# Patient Record
Sex: FEMALE | Race: WHITE | NOT HISPANIC OR LATINO | Employment: FULL TIME | URBAN - METROPOLITAN AREA
[De-identification: names, ages, dates, MRNs, and addresses within clinical notes are randomized per-mention and may not be internally consistent; named-entity substitution may affect disease eponyms.]

---

## 2019-04-11 ENCOUNTER — HOSPITAL ENCOUNTER (INPATIENT)
Facility: HOSPITAL | Age: 44
LOS: 1 days | Discharge: HOME | DRG: 812 | End: 2019-04-12
Attending: EMERGENCY MEDICINE | Admitting: INTERNAL MEDICINE
Payer: COMMERCIAL

## 2019-04-11 DIAGNOSIS — D64.9 SYMPTOMATIC ANEMIA: ICD-10-CM

## 2019-04-11 DIAGNOSIS — K25.9 GASTRIC ULCER WITHOUT HEMORRHAGE OR PERFORATION, UNSPECIFIED CHRONICITY: Primary | ICD-10-CM

## 2019-04-11 PROBLEM — T80.92XA: Status: ACTIVE | Noted: 2019-04-11

## 2019-04-11 PROBLEM — Z98.84 HISTORY OF ROUX-EN-Y GASTRIC BYPASS: Status: ACTIVE | Noted: 2017-02-20

## 2019-04-11 PROBLEM — F41.9 ANXIETY AND DEPRESSION: Status: ACTIVE | Noted: 2017-02-20

## 2019-04-11 PROBLEM — E87.6 HYPOKALEMIA: Status: ACTIVE | Noted: 2019-04-11

## 2019-04-11 PROBLEM — F32.A ANXIETY AND DEPRESSION: Status: ACTIVE | Noted: 2017-02-20

## 2019-04-11 PROBLEM — G47.00 INSOMNIA: Status: ACTIVE | Noted: 2017-02-20

## 2019-04-11 LAB
ABO GROUP BLD: NORMAL
ALBUMIN SERPL BCP-MCNC: 3.2 G/DL (ref 3.5–5)
ALP SERPL-CCNC: 75 U/L (ref 46–116)
ALT SERPL W P-5'-P-CCNC: 13 U/L (ref 12–78)
ANION GAP SERPL CALCULATED.3IONS-SCNC: 5 MMOL/L (ref 4–13)
ANION GAP SERPL CALCULATED.3IONS-SCNC: 7 MMOL/L (ref 4–13)
APTT PPP: 24 SECONDS (ref 26–38)
AST SERPL W P-5'-P-CCNC: 17 U/L (ref 5–45)
ATRIAL RATE: 105 BPM
BASOPHILS # BLD AUTO: 0.04 THOUSANDS/ΜL (ref 0–0.1)
BASOPHILS NFR BLD AUTO: 1 % (ref 0–1)
BILIRUB SERPL-MCNC: 0.42 MG/DL (ref 0.2–1)
BILIRUB UR QL STRIP: ABNORMAL
BLD GP AB SCN SERPL QL: NEGATIVE
BUN SERPL-MCNC: 5 MG/DL (ref 5–25)
BUN SERPL-MCNC: 8 MG/DL (ref 5–25)
CALCIUM SERPL-MCNC: 7.8 MG/DL (ref 8.3–10.1)
CALCIUM SERPL-MCNC: 8 MG/DL (ref 8.3–10.1)
CHLORIDE SERPL-SCNC: 103 MMOL/L (ref 100–108)
CHLORIDE SERPL-SCNC: 106 MMOL/L (ref 100–108)
CLARITY UR: ABNORMAL
CO2 SERPL-SCNC: 26 MMOL/L (ref 21–32)
CO2 SERPL-SCNC: 28 MMOL/L (ref 21–32)
COLOR UR: YELLOW
CREAT SERPL-MCNC: 0.65 MG/DL (ref 0.6–1.3)
CREAT SERPL-MCNC: 0.76 MG/DL (ref 0.6–1.3)
EOSINOPHIL # BLD AUTO: 0.08 THOUSAND/ΜL (ref 0–0.61)
EOSINOPHIL NFR BLD AUTO: 1 % (ref 0–6)
ERYTHROCYTE [DISTWIDTH] IN BLOOD BY AUTOMATED COUNT: 24.7 % (ref 11.6–15.1)
EXT PREG TEST URINE: NEGATIVE
FERRITIN SERPL-MCNC: 3 NG/ML (ref 8–388)
FOLATE SERPL-MCNC: 3.3 NG/ML (ref 3.1–17.5)
GFR SERPL CREATININE-BSD FRML MDRD: 110 ML/MIN/1.73SQ M
GFR SERPL CREATININE-BSD FRML MDRD: 97 ML/MIN/1.73SQ M
GLUCOSE SERPL-MCNC: 104 MG/DL (ref 65–140)
GLUCOSE SERPL-MCNC: 146 MG/DL (ref 65–140)
GLUCOSE UR STRIP-MCNC: NEGATIVE MG/DL
HCT VFR BLD AUTO: 23.6 % (ref 34.8–46.1)
HCYS SERPL-SCNC: 48.8 UMOL/L (ref 3.7–11.2)
HGB BLD-MCNC: 6.4 G/DL (ref 11.5–15.4)
HGB UR QL STRIP.AUTO: NEGATIVE
IMM GRANULOCYTES # BLD AUTO: 0.03 THOUSAND/UL (ref 0–0.2)
IMM GRANULOCYTES NFR BLD AUTO: 0 % (ref 0–2)
INR PPP: 1.02 (ref 0.86–1.17)
IRON SATN MFR SERPL: 4 %
IRON SERPL-MCNC: 20 UG/DL (ref 50–170)
KETONES UR STRIP-MCNC: ABNORMAL MG/DL
LEUKOCYTE ESTERASE UR QL STRIP: NEGATIVE
LYMPHOCYTES # BLD AUTO: 1.21 THOUSANDS/ΜL (ref 0.6–4.47)
LYMPHOCYTES NFR BLD AUTO: 16 % (ref 14–44)
MCH RBC QN AUTO: 21.1 PG (ref 26.8–34.3)
MCHC RBC AUTO-ENTMCNC: 27.1 G/DL (ref 31.4–37.4)
MCV RBC AUTO: 78 FL (ref 82–98)
MONOCYTES # BLD AUTO: 0.43 THOUSAND/ΜL (ref 0.17–1.22)
MONOCYTES NFR BLD AUTO: 6 % (ref 4–12)
NEUTROPHILS # BLD AUTO: 5.62 THOUSANDS/ΜL (ref 1.85–7.62)
NEUTS SEG NFR BLD AUTO: 76 % (ref 43–75)
NITRITE UR QL STRIP: NEGATIVE
NRBC BLD AUTO-RTO: 0 /100 WBCS
P AXIS: 76 DEGREES
PH UR STRIP.AUTO: 5.5 [PH] (ref 4.5–8)
PLATELET # BLD AUTO: 331 THOUSANDS/UL (ref 149–390)
PMV BLD AUTO: 9.7 FL (ref 8.9–12.7)
POTASSIUM SERPL-SCNC: 2.9 MMOL/L (ref 3.5–5.3)
POTASSIUM SERPL-SCNC: 3.6 MMOL/L (ref 3.5–5.3)
PR INTERVAL: 122 MS
PROT SERPL-MCNC: 7.4 G/DL (ref 6.4–8.2)
PROT UR STRIP-MCNC: NEGATIVE MG/DL
PROTHROMBIN TIME: 13.5 SECONDS (ref 11.8–14.2)
QRS AXIS: 72 DEGREES
QRSD INTERVAL: 82 MS
QT INTERVAL: 358 MS
QTC INTERVAL: 473 MS
RBC # BLD AUTO: 3.03 MILLION/UL (ref 3.81–5.12)
RH BLD: POSITIVE
SODIUM SERPL-SCNC: 136 MMOL/L (ref 136–145)
SODIUM SERPL-SCNC: 139 MMOL/L (ref 136–145)
SP GR UR STRIP.AUTO: >=1.03 (ref 1–1.03)
SPECIMEN EXPIRATION DATE: NORMAL
T WAVE AXIS: 55 DEGREES
TIBC SERPL-MCNC: 511 UG/DL (ref 250–450)
UROBILINOGEN UR QL STRIP.AUTO: 1 E.U./DL
VENTRICULAR RATE: 105 BPM
VIT B12 SERPL-MCNC: 86 PG/ML (ref 100–900)
WBC # BLD AUTO: 7.41 THOUSAND/UL (ref 4.31–10.16)

## 2019-04-11 PROCEDURE — 99221 1ST HOSP IP/OBS SF/LOW 40: CPT | Performed by: INTERNAL MEDICINE

## 2019-04-11 PROCEDURE — 86901 BLOOD TYPING SEROLOGIC RH(D): CPT | Performed by: EMERGENCY MEDICINE

## 2019-04-11 PROCEDURE — 99285 EMERGENCY DEPT VISIT HI MDM: CPT

## 2019-04-11 PROCEDURE — 82607 VITAMIN B-12: CPT | Performed by: INTERNAL MEDICINE

## 2019-04-11 PROCEDURE — 86900 BLOOD TYPING SEROLOGIC ABO: CPT | Performed by: EMERGENCY MEDICINE

## 2019-04-11 PROCEDURE — 85730 THROMBOPLASTIN TIME PARTIAL: CPT | Performed by: EMERGENCY MEDICINE

## 2019-04-11 PROCEDURE — 30233P1 TRANSFUSION OF NONAUTOLOGOUS FROZEN RED CELLS INTO PERIPHERAL VEIN, PERCUTANEOUS APPROACH: ICD-10-PCS | Performed by: INTERNAL MEDICINE

## 2019-04-11 PROCEDURE — P9016 RBC LEUKOCYTES REDUCED: HCPCS

## 2019-04-11 PROCEDURE — 83540 ASSAY OF IRON: CPT | Performed by: INTERNAL MEDICINE

## 2019-04-11 PROCEDURE — 93005 ELECTROCARDIOGRAM TRACING: CPT

## 2019-04-11 PROCEDURE — 81003 URINALYSIS AUTO W/O SCOPE: CPT

## 2019-04-11 PROCEDURE — 82746 ASSAY OF FOLIC ACID SERUM: CPT | Performed by: INTERNAL MEDICINE

## 2019-04-11 PROCEDURE — 99223 1ST HOSP IP/OBS HIGH 75: CPT | Performed by: INTERNAL MEDICINE

## 2019-04-11 PROCEDURE — 82272 OCCULT BLD FECES 1-3 TESTS: CPT

## 2019-04-11 PROCEDURE — 83090 ASSAY OF HOMOCYSTEINE: CPT | Performed by: INTERNAL MEDICINE

## 2019-04-11 PROCEDURE — 81025 URINE PREGNANCY TEST: CPT | Performed by: EMERGENCY MEDICINE

## 2019-04-11 PROCEDURE — 99285 EMERGENCY DEPT VISIT HI MDM: CPT | Performed by: EMERGENCY MEDICINE

## 2019-04-11 PROCEDURE — 36415 COLL VENOUS BLD VENIPUNCTURE: CPT | Performed by: EMERGENCY MEDICINE

## 2019-04-11 PROCEDURE — 82728 ASSAY OF FERRITIN: CPT | Performed by: INTERNAL MEDICINE

## 2019-04-11 PROCEDURE — 85025 COMPLETE CBC W/AUTO DIFF WBC: CPT | Performed by: EMERGENCY MEDICINE

## 2019-04-11 PROCEDURE — 86850 RBC ANTIBODY SCREEN: CPT | Performed by: EMERGENCY MEDICINE

## 2019-04-11 PROCEDURE — 80048 BASIC METABOLIC PNL TOTAL CA: CPT | Performed by: INTERNAL MEDICINE

## 2019-04-11 PROCEDURE — 93010 ELECTROCARDIOGRAM REPORT: CPT | Performed by: INTERNAL MEDICINE

## 2019-04-11 PROCEDURE — 86920 COMPATIBILITY TEST SPIN: CPT

## 2019-04-11 PROCEDURE — 80053 COMPREHEN METABOLIC PANEL: CPT | Performed by: EMERGENCY MEDICINE

## 2019-04-11 PROCEDURE — 85610 PROTHROMBIN TIME: CPT | Performed by: EMERGENCY MEDICINE

## 2019-04-11 PROCEDURE — 83550 IRON BINDING TEST: CPT | Performed by: INTERNAL MEDICINE

## 2019-04-11 RX ORDER — ALPRAZOLAM 0.5 MG/1
1 TABLET ORAL DAILY PRN
Status: DISCONTINUED | OUTPATIENT
Start: 2019-04-11 | End: 2019-04-12 | Stop reason: HOSPADM

## 2019-04-11 RX ORDER — TRAMADOL HYDROCHLORIDE 50 MG/1
50 TABLET ORAL ONCE
Status: COMPLETED | OUTPATIENT
Start: 2019-04-11 | End: 2019-04-11

## 2019-04-11 RX ORDER — POTASSIUM CHLORIDE 20 MEQ/1
40 TABLET, EXTENDED RELEASE ORAL ONCE
Status: COMPLETED | OUTPATIENT
Start: 2019-04-11 | End: 2019-04-11

## 2019-04-11 RX ORDER — MULTIVIT WITH IRON,MINERALS
15 LIQUID (ML) ORAL DAILY
Status: DISCONTINUED | OUTPATIENT
Start: 2019-04-11 | End: 2019-04-12 | Stop reason: HOSPADM

## 2019-04-11 RX ORDER — MAGNESIUM SULFATE HEPTAHYDRATE 40 MG/ML
2 INJECTION, SOLUTION INTRAVENOUS ONCE
Status: COMPLETED | OUTPATIENT
Start: 2019-04-11 | End: 2019-04-11

## 2019-04-11 RX ORDER — POTASSIUM CHLORIDE 20 MEQ/1
60 TABLET, EXTENDED RELEASE ORAL ONCE
Status: COMPLETED | OUTPATIENT
Start: 2019-04-11 | End: 2019-04-11

## 2019-04-11 RX ORDER — POTASSIUM CHLORIDE 20 MEQ/1
20 TABLET, EXTENDED RELEASE ORAL ONCE
Status: COMPLETED | OUTPATIENT
Start: 2019-04-11 | End: 2019-04-11

## 2019-04-11 RX ORDER — PANTOPRAZOLE SODIUM 40 MG/1
40 TABLET, DELAYED RELEASE ORAL
Status: DISCONTINUED | OUTPATIENT
Start: 2019-04-11 | End: 2019-04-12 | Stop reason: HOSPADM

## 2019-04-11 RX ORDER — METHOCARBAMOL 750 MG/1
750 TABLET, FILM COATED ORAL EVERY 6 HOURS PRN
Status: DISCONTINUED | OUTPATIENT
Start: 2019-04-11 | End: 2019-04-12 | Stop reason: HOSPADM

## 2019-04-11 RX ORDER — DOCUSATE SODIUM 100 MG/1
100 CAPSULE, LIQUID FILLED ORAL 2 TIMES DAILY
Status: DISCONTINUED | OUTPATIENT
Start: 2019-04-11 | End: 2019-04-12 | Stop reason: HOSPADM

## 2019-04-11 RX ORDER — CITALOPRAM 20 MG/1
20 TABLET ORAL DAILY
Status: DISCONTINUED | OUTPATIENT
Start: 2019-04-11 | End: 2019-04-12 | Stop reason: HOSPADM

## 2019-04-11 RX ORDER — LIDOCAINE 50 MG/G
1 PATCH TOPICAL DAILY
Status: DISCONTINUED | OUTPATIENT
Start: 2019-04-12 | End: 2019-04-12 | Stop reason: HOSPADM

## 2019-04-11 RX ORDER — ACETAMINOPHEN 325 MG/1
650 TABLET ORAL EVERY 6 HOURS PRN
Status: DISCONTINUED | OUTPATIENT
Start: 2019-04-11 | End: 2019-04-12 | Stop reason: HOSPADM

## 2019-04-11 RX ORDER — ONDANSETRON 2 MG/ML
4 INJECTION INTRAMUSCULAR; INTRAVENOUS EVERY 6 HOURS PRN
Status: DISCONTINUED | OUTPATIENT
Start: 2019-04-11 | End: 2019-04-12 | Stop reason: HOSPADM

## 2019-04-11 RX ORDER — ERGOCALCIFEROL 1.25 MG/1
50000 CAPSULE ORAL 3 TIMES WEEKLY
Status: DISCONTINUED | OUTPATIENT
Start: 2019-04-12 | End: 2019-04-12 | Stop reason: HOSPADM

## 2019-04-11 RX ADMIN — MAGNESIUM SULFATE HEPTAHYDRATE 2 G: 40 INJECTION, SOLUTION INTRAVENOUS at 14:59

## 2019-04-11 RX ADMIN — CITALOPRAM HYDROBROMIDE 20 MG: 20 TABLET ORAL at 14:53

## 2019-04-11 RX ADMIN — POTASSIUM CHLORIDE 40 MEQ: 1500 TABLET, EXTENDED RELEASE ORAL at 19:50

## 2019-04-11 RX ADMIN — POTASSIUM CHLORIDE 20 MEQ: 1500 TABLET, EXTENDED RELEASE ORAL at 14:54

## 2019-04-11 RX ADMIN — PANTOPRAZOLE SODIUM 40 MG: 40 TABLET, DELAYED RELEASE ORAL at 17:18

## 2019-04-11 RX ADMIN — TRAMADOL HYDROCHLORIDE 50 MG: 50 TABLET ORAL at 20:30

## 2019-04-11 RX ADMIN — POTASSIUM CHLORIDE 60 MEQ: 1500 TABLET, EXTENDED RELEASE ORAL at 11:54

## 2019-04-12 VITALS
DIASTOLIC BLOOD PRESSURE: 64 MMHG | RESPIRATION RATE: 20 BRPM | SYSTOLIC BLOOD PRESSURE: 126 MMHG | BODY MASS INDEX: 36.73 KG/M2 | TEMPERATURE: 97.9 F | HEART RATE: 84 BPM | HEIGHT: 65 IN | OXYGEN SATURATION: 97 % | WEIGHT: 220.46 LBS

## 2019-04-12 LAB
ABO GROUP BLD BPU: NORMAL
ABO GROUP BLD BPU: NORMAL
ALBUMIN SERPL BCP-MCNC: 2.6 G/DL (ref 3.5–5)
ALP SERPL-CCNC: 61 U/L (ref 46–116)
ALT SERPL W P-5'-P-CCNC: 10 U/L (ref 12–78)
ANION GAP SERPL CALCULATED.3IONS-SCNC: 4 MMOL/L (ref 4–13)
AST SERPL W P-5'-P-CCNC: 15 U/L (ref 5–45)
BILIRUB SERPL-MCNC: 0.51 MG/DL (ref 0.2–1)
BPU ID: NORMAL
BPU ID: NORMAL
BUN SERPL-MCNC: 5 MG/DL (ref 5–25)
CALCIUM SERPL-MCNC: 7.6 MG/DL (ref 8.3–10.1)
CHLORIDE SERPL-SCNC: 109 MMOL/L (ref 100–108)
CO2 SERPL-SCNC: 26 MMOL/L (ref 21–32)
CREAT SERPL-MCNC: 0.55 MG/DL (ref 0.6–1.3)
CROSSMATCH: NORMAL
CROSSMATCH: NORMAL
ERYTHROCYTE [DISTWIDTH] IN BLOOD BY AUTOMATED COUNT: 22.8 % (ref 11.6–15.1)
GFR SERPL CREATININE-BSD FRML MDRD: 116 ML/MIN/1.73SQ M
GLUCOSE SERPL-MCNC: 99 MG/DL (ref 65–140)
HCT VFR BLD AUTO: 26.4 % (ref 34.8–46.1)
HGB BLD-MCNC: 7.5 G/DL (ref 11.5–15.4)
MAGNESIUM SERPL-MCNC: 2.2 MG/DL (ref 1.6–2.6)
MCH RBC QN AUTO: 23.1 PG (ref 26.8–34.3)
MCHC RBC AUTO-ENTMCNC: 28.4 G/DL (ref 31.4–37.4)
MCV RBC AUTO: 81 FL (ref 82–98)
PHOSPHATE SERPL-MCNC: 2.7 MG/DL (ref 2.7–4.5)
PLATELET # BLD AUTO: 239 THOUSANDS/UL (ref 149–390)
PMV BLD AUTO: 9.6 FL (ref 8.9–12.7)
POTASSIUM SERPL-SCNC: 3.9 MMOL/L (ref 3.5–5.3)
PROT SERPL-MCNC: 6.4 G/DL (ref 6.4–8.2)
RBC # BLD AUTO: 3.25 MILLION/UL (ref 3.81–5.12)
SODIUM SERPL-SCNC: 139 MMOL/L (ref 136–145)
UNIT DISPENSE STATUS: NORMAL
UNIT DISPENSE STATUS: NORMAL
UNIT PRODUCT CODE: NORMAL
UNIT PRODUCT CODE: NORMAL
UNIT RH: NORMAL
UNIT RH: NORMAL
WBC # BLD AUTO: 5.14 THOUSAND/UL (ref 4.31–10.16)

## 2019-04-12 PROCEDURE — 80053 COMPREHEN METABOLIC PANEL: CPT | Performed by: INTERNAL MEDICINE

## 2019-04-12 PROCEDURE — 85027 COMPLETE CBC AUTOMATED: CPT | Performed by: INTERNAL MEDICINE

## 2019-04-12 PROCEDURE — 83735 ASSAY OF MAGNESIUM: CPT | Performed by: INTERNAL MEDICINE

## 2019-04-12 PROCEDURE — 84100 ASSAY OF PHOSPHORUS: CPT | Performed by: INTERNAL MEDICINE

## 2019-04-12 PROCEDURE — 99238 HOSP IP/OBS DSCHRG MGMT 30/<: CPT | Performed by: INTERNAL MEDICINE

## 2019-04-12 RX ORDER — METHOCARBAMOL 750 MG/1
750 TABLET, FILM COATED ORAL EVERY 6 HOURS PRN
Qty: 30 TABLET | Refills: 0 | Status: SHIPPED | OUTPATIENT
Start: 2019-04-12 | End: 2021-10-14 | Stop reason: ALTCHOICE

## 2019-04-12 RX ORDER — DOCUSATE SODIUM 100 MG/1
100 CAPSULE, LIQUID FILLED ORAL 2 TIMES DAILY
Qty: 10 CAPSULE | Refills: 0 | Status: SHIPPED | OUTPATIENT
Start: 2019-04-12 | End: 2021-10-14 | Stop reason: ALTCHOICE

## 2019-04-12 RX ORDER — LIDOCAINE 50 MG/G
1 PATCH TOPICAL DAILY
Qty: 30 PATCH | Refills: 0 | Status: SHIPPED | OUTPATIENT
Start: 2019-04-13

## 2019-04-12 RX ORDER — ERGOCALCIFEROL 1.25 MG/1
50000 CAPSULE ORAL 3 TIMES WEEKLY
Qty: 90 CAPSULE | Refills: 0 | Status: SHIPPED | OUTPATIENT
Start: 2019-04-15

## 2019-04-12 RX ORDER — MULTIVIT WITH IRON,MINERALS
15 LIQUID (ML) ORAL DAILY
Qty: 236 ML | Refills: 0 | Status: SHIPPED | OUTPATIENT
Start: 2019-04-13

## 2019-04-12 RX ORDER — PANTOPRAZOLE SODIUM 40 MG/1
40 TABLET, DELAYED RELEASE ORAL
Qty: 60 TABLET | Refills: 0 | Status: ON HOLD | OUTPATIENT
Start: 2019-04-12 | End: 2021-10-15 | Stop reason: SDUPTHER

## 2019-04-12 RX ORDER — ACETAMINOPHEN 325 MG/1
650 TABLET ORAL EVERY 6 HOURS PRN
Qty: 30 TABLET | Refills: 0 | Status: SHIPPED | OUTPATIENT
Start: 2019-04-12

## 2019-04-12 RX ADMIN — CITALOPRAM HYDROBROMIDE 20 MG: 20 TABLET ORAL at 08:51

## 2019-04-12 RX ADMIN — PANTOPRAZOLE SODIUM 40 MG: 40 TABLET, DELAYED RELEASE ORAL at 06:06

## 2019-04-12 RX ADMIN — ERGOCALCIFEROL 50000 UNITS: 1.25 CAPSULE ORAL at 08:51

## 2019-04-12 RX ADMIN — LIDOCAINE 1 PATCH: 50 PATCH CUTANEOUS at 08:51

## 2019-04-12 RX ADMIN — MULTIVITAMIN 15 ML: LIQUID ORAL at 08:52

## 2019-04-17 ENCOUNTER — TELEPHONE (OUTPATIENT)
Dept: GASTROENTEROLOGY | Facility: CLINIC | Age: 44
End: 2019-04-17

## 2021-10-14 ENCOUNTER — APPOINTMENT (OUTPATIENT)
Dept: ULTRASOUND IMAGING | Facility: HOSPITAL | Age: 46
End: 2021-10-14
Payer: COMMERCIAL

## 2021-10-14 ENCOUNTER — APPOINTMENT (OUTPATIENT)
Dept: GASTROENTEROLOGY | Facility: HOSPITAL | Age: 46
End: 2021-10-14
Payer: COMMERCIAL

## 2021-10-14 ENCOUNTER — HOSPITAL ENCOUNTER (OUTPATIENT)
Facility: HOSPITAL | Age: 46
Setting detail: OBSERVATION
Discharge: HOME | End: 2021-10-15
Attending: EMERGENCY MEDICINE | Admitting: INTERNAL MEDICINE
Payer: COMMERCIAL

## 2021-10-14 ENCOUNTER — ANESTHESIA (OUTPATIENT)
Dept: GASTROENTEROLOGY | Facility: HOSPITAL | Age: 46
End: 2021-10-14
Payer: COMMERCIAL

## 2021-10-14 ENCOUNTER — ANESTHESIA EVENT (OUTPATIENT)
Dept: GASTROENTEROLOGY | Facility: HOSPITAL | Age: 46
End: 2021-10-14
Payer: COMMERCIAL

## 2021-10-14 DIAGNOSIS — K25.9 GASTRIC ULCER WITHOUT HEMORRHAGE OR PERFORATION, UNSPECIFIED CHRONICITY: ICD-10-CM

## 2021-10-14 DIAGNOSIS — K92.1 MELENA: ICD-10-CM

## 2021-10-14 DIAGNOSIS — Z98.84 HISTORY OF ROUX-EN-Y GASTRIC BYPASS: ICD-10-CM

## 2021-10-14 DIAGNOSIS — K92.2 GI BLEED: Primary | ICD-10-CM

## 2021-10-14 DIAGNOSIS — D64.9 ANEMIA: ICD-10-CM

## 2021-10-14 LAB
ABO GROUP BLD: NORMAL
ALBUMIN SERPL BCP-MCNC: 2.9 G/DL (ref 3.5–5)
ALP SERPL-CCNC: 159 U/L (ref 46–116)
ALT SERPL W P-5'-P-CCNC: 21 U/L (ref 12–78)
ANION GAP SERPL CALCULATED.3IONS-SCNC: 10 MMOL/L (ref 4–13)
APTT PPP: 25 SECONDS (ref 23–37)
AST SERPL W P-5'-P-CCNC: 46 U/L (ref 5–45)
ATRIAL RATE: 111 BPM
BASOPHILS # BLD AUTO: 0.02 THOUSANDS/ΜL (ref 0–0.1)
BASOPHILS NFR BLD AUTO: 0 % (ref 0–1)
BILIRUB SERPL-MCNC: 0.39 MG/DL (ref 0.2–1)
BILIRUB UR QL STRIP: NEGATIVE
BLD GP AB SCN SERPL QL: NEGATIVE
BUN SERPL-MCNC: 13 MG/DL (ref 5–25)
CALCIUM ALBUM COR SERPL-MCNC: 9.2 MG/DL (ref 8.3–10.1)
CALCIUM SERPL-MCNC: 8.3 MG/DL (ref 8.3–10.1)
CHLORIDE SERPL-SCNC: 104 MMOL/L (ref 100–108)
CLARITY UR: NORMAL
CO2 SERPL-SCNC: 26 MMOL/L (ref 21–32)
COLOR UR: NORMAL
CREAT SERPL-MCNC: 0.65 MG/DL (ref 0.6–1.3)
EOSINOPHIL # BLD AUTO: 0.04 THOUSAND/ΜL (ref 0–0.61)
EOSINOPHIL NFR BLD AUTO: 1 % (ref 0–6)
ERYTHROCYTE [DISTWIDTH] IN BLOOD BY AUTOMATED COUNT: 14.8 % (ref 11.6–15.1)
EXT PREGNANCY TEST URINE: NEGATIVE
EXT. CONTROL: NORMAL
GFR SERPL CREATININE-BSD FRML MDRD: 108 ML/MIN/1.73SQ M
GLUCOSE SERPL-MCNC: 111 MG/DL (ref 65–140)
GLUCOSE UR STRIP-MCNC: NEGATIVE MG/DL
HCT VFR BLD AUTO: 29.2 % (ref 34.8–46.1)
HCT VFR BLD AUTO: 30.7 % (ref 34.8–46.1)
HGB BLD-MCNC: 10.3 G/DL (ref 11.5–15.4)
HGB BLD-MCNC: 9.4 G/DL (ref 11.5–15.4)
HGB UR QL STRIP.AUTO: NEGATIVE
IMM GRANULOCYTES # BLD AUTO: 0.06 THOUSAND/UL (ref 0–0.2)
IMM GRANULOCYTES NFR BLD AUTO: 1 % (ref 0–2)
INR PPP: 0.96 (ref 0.84–1.19)
KETONES UR STRIP-MCNC: NEGATIVE MG/DL
LEUKOCYTE ESTERASE UR QL STRIP: NEGATIVE
LYMPHOCYTES # BLD AUTO: 1.26 THOUSANDS/ΜL (ref 0.6–4.47)
LYMPHOCYTES NFR BLD AUTO: 20 % (ref 14–44)
MCH RBC QN AUTO: 39.5 PG (ref 26.8–34.3)
MCHC RBC AUTO-ENTMCNC: 33.6 G/DL (ref 31.4–37.4)
MCV RBC AUTO: 118 FL (ref 82–98)
MONOCYTES # BLD AUTO: 0.34 THOUSAND/ΜL (ref 0.17–1.22)
MONOCYTES NFR BLD AUTO: 5 % (ref 4–12)
NEUTROPHILS # BLD AUTO: 4.56 THOUSANDS/ΜL (ref 1.85–7.62)
NEUTS SEG NFR BLD AUTO: 73 % (ref 43–75)
NITRITE UR QL STRIP: NEGATIVE
NRBC BLD AUTO-RTO: 0 /100 WBCS
P AXIS: 64 DEGREES
PH UR STRIP.AUTO: 7.5 [PH] (ref 4.5–8)
PLATELET # BLD AUTO: 268 THOUSANDS/UL (ref 149–390)
PMV BLD AUTO: 9.6 FL (ref 8.9–12.7)
POTASSIUM SERPL-SCNC: 3.7 MMOL/L (ref 3.5–5.3)
PR INTERVAL: 130 MS
PROT SERPL-MCNC: 6.5 G/DL (ref 6.4–8.2)
PROT UR STRIP-MCNC: NEGATIVE MG/DL
PROTHROMBIN TIME: 12.8 SECONDS (ref 11.6–14.5)
QRS AXIS: 56 DEGREES
QRSD INTERVAL: 76 MS
QT INTERVAL: 338 MS
QTC INTERVAL: 459 MS
RBC # BLD AUTO: 2.61 MILLION/UL (ref 3.81–5.12)
RH BLD: POSITIVE
SODIUM SERPL-SCNC: 140 MMOL/L (ref 136–145)
SP GR UR STRIP.AUTO: 1.01 (ref 1–1.03)
SPECIMEN EXPIRATION DATE: NORMAL
T WAVE AXIS: 61 DEGREES
TROPONIN I SERPL-MCNC: <0.02 NG/ML
UROBILINOGEN UR QL STRIP.AUTO: 0.2 E.U./DL
VENTRICULAR RATE: 111 BPM
WBC # BLD AUTO: 6.28 THOUSAND/UL (ref 4.31–10.16)

## 2021-10-14 PROCEDURE — 99220 PR INITIAL OBSERVATION CARE/DAY 70 MINUTES: CPT | Performed by: INTERNAL MEDICINE

## 2021-10-14 PROCEDURE — 93010 ELECTROCARDIOGRAM REPORT: CPT | Performed by: INTERNAL MEDICINE

## 2021-10-14 PROCEDURE — 84484 ASSAY OF TROPONIN QUANT: CPT | Performed by: PHYSICIAN ASSISTANT

## 2021-10-14 PROCEDURE — 81003 URINALYSIS AUTO W/O SCOPE: CPT

## 2021-10-14 PROCEDURE — 81025 URINE PREGNANCY TEST: CPT | Performed by: INTERNAL MEDICINE

## 2021-10-14 PROCEDURE — 85610 PROTHROMBIN TIME: CPT | Performed by: PHYSICIAN ASSISTANT

## 2021-10-14 PROCEDURE — 86901 BLOOD TYPING SEROLOGIC RH(D): CPT | Performed by: PHYSICIAN ASSISTANT

## 2021-10-14 PROCEDURE — 93005 ELECTROCARDIOGRAM TRACING: CPT

## 2021-10-14 PROCEDURE — 85025 COMPLETE CBC W/AUTO DIFF WBC: CPT | Performed by: PHYSICIAN ASSISTANT

## 2021-10-14 PROCEDURE — 85018 HEMOGLOBIN: CPT | Performed by: INTERNAL MEDICINE

## 2021-10-14 PROCEDURE — 85730 THROMBOPLASTIN TIME PARTIAL: CPT | Performed by: PHYSICIAN ASSISTANT

## 2021-10-14 PROCEDURE — C9113 INJ PANTOPRAZOLE SODIUM, VIA: HCPCS | Performed by: INTERNAL MEDICINE

## 2021-10-14 PROCEDURE — 99285 EMERGENCY DEPT VISIT HI MDM: CPT | Performed by: PHYSICIAN ASSISTANT

## 2021-10-14 PROCEDURE — 88305 TISSUE EXAM BY PATHOLOGIST: CPT | Performed by: PATHOLOGY

## 2021-10-14 PROCEDURE — 36415 COLL VENOUS BLD VENIPUNCTURE: CPT | Performed by: PHYSICIAN ASSISTANT

## 2021-10-14 PROCEDURE — 80053 COMPREHEN METABOLIC PANEL: CPT | Performed by: PHYSICIAN ASSISTANT

## 2021-10-14 PROCEDURE — 43239 EGD BIOPSY SINGLE/MULTIPLE: CPT | Performed by: INTERNAL MEDICINE

## 2021-10-14 PROCEDURE — 86900 BLOOD TYPING SEROLOGIC ABO: CPT | Performed by: PHYSICIAN ASSISTANT

## 2021-10-14 PROCEDURE — C9113 INJ PANTOPRAZOLE SODIUM, VIA: HCPCS | Performed by: PHYSICIAN ASSISTANT

## 2021-10-14 PROCEDURE — 76705 ECHO EXAM OF ABDOMEN: CPT

## 2021-10-14 PROCEDURE — 99204 OFFICE O/P NEW MOD 45 MIN: CPT | Performed by: INTERNAL MEDICINE

## 2021-10-14 PROCEDURE — 99285 EMERGENCY DEPT VISIT HI MDM: CPT

## 2021-10-14 PROCEDURE — 85014 HEMATOCRIT: CPT | Performed by: INTERNAL MEDICINE

## 2021-10-14 PROCEDURE — 86850 RBC ANTIBODY SCREEN: CPT | Performed by: PHYSICIAN ASSISTANT

## 2021-10-14 RX ORDER — SUCRALFATE ORAL 1 G/10ML
1000 SUSPENSION ORAL EVERY 6 HOURS SCHEDULED
Status: DISCONTINUED | OUTPATIENT
Start: 2021-10-14 | End: 2021-10-14

## 2021-10-14 RX ORDER — HYDROMORPHONE HCL/PF 1 MG/ML
0.5 SYRINGE (ML) INJECTION
Status: COMPLETED | OUTPATIENT
Start: 2021-10-14 | End: 2021-10-14

## 2021-10-14 RX ORDER — BUPROPION HYDROCHLORIDE 75 MG/1
75 TABLET ORAL DAILY
COMMUNITY

## 2021-10-14 RX ORDER — PROPOFOL 10 MG/ML
INJECTION, EMULSION INTRAVENOUS AS NEEDED
Status: DISCONTINUED | OUTPATIENT
Start: 2021-10-14 | End: 2021-10-14

## 2021-10-14 RX ORDER — NICOTINE 21 MG/24HR
1 PATCH, TRANSDERMAL 24 HOURS TRANSDERMAL DAILY
Status: DISCONTINUED | OUTPATIENT
Start: 2021-10-14 | End: 2021-10-15 | Stop reason: HOSPADM

## 2021-10-14 RX ORDER — SODIUM CHLORIDE 9 MG/ML
75 INJECTION, SOLUTION INTRAVENOUS CONTINUOUS
Status: DISCONTINUED | OUTPATIENT
Start: 2021-10-14 | End: 2021-10-15 | Stop reason: HOSPADM

## 2021-10-14 RX ORDER — SUCRALFATE 1 G/1
1 TABLET ORAL EVERY 6 HOURS
Status: DISCONTINUED | OUTPATIENT
Start: 2021-10-14 | End: 2021-10-15 | Stop reason: HOSPADM

## 2021-10-14 RX ORDER — ACETAMINOPHEN 325 MG/1
650 TABLET ORAL EVERY 6 HOURS PRN
Status: DISCONTINUED | OUTPATIENT
Start: 2021-10-14 | End: 2021-10-15 | Stop reason: HOSPADM

## 2021-10-14 RX ORDER — LIDOCAINE HYDROCHLORIDE 10 MG/ML
INJECTION, SOLUTION EPIDURAL; INFILTRATION; INTRACAUDAL; PERINEURAL AS NEEDED
Status: DISCONTINUED | OUTPATIENT
Start: 2021-10-14 | End: 2021-10-14

## 2021-10-14 RX ORDER — PANTOPRAZOLE SODIUM 40 MG/1
40 INJECTION, POWDER, FOR SOLUTION INTRAVENOUS ONCE
Status: COMPLETED | OUTPATIENT
Start: 2021-10-14 | End: 2021-10-14

## 2021-10-14 RX ORDER — CITALOPRAM 20 MG/1
20 TABLET ORAL DAILY
Status: DISCONTINUED | OUTPATIENT
Start: 2021-10-15 | End: 2021-10-15

## 2021-10-14 RX ORDER — ALPRAZOLAM 0.5 MG/1
1 TABLET ORAL 2 TIMES DAILY PRN
Status: DISCONTINUED | OUTPATIENT
Start: 2021-10-14 | End: 2021-10-15 | Stop reason: HOSPADM

## 2021-10-14 RX ADMIN — SUCRALFATE 1 G: 1 TABLET ORAL at 23:30

## 2021-10-14 RX ADMIN — ALPRAZOLAM 1 MG: 0.5 TABLET ORAL at 23:30

## 2021-10-14 RX ADMIN — SODIUM CHLORIDE 8 MG/HR: 9 INJECTION, SOLUTION INTRAVENOUS at 17:31

## 2021-10-14 RX ADMIN — PROPOFOL 180 MG: 10 INJECTION, EMULSION INTRAVENOUS at 12:15

## 2021-10-14 RX ADMIN — SODIUM CHLORIDE 1000 ML: 0.9 INJECTION, SOLUTION INTRAVENOUS at 09:42

## 2021-10-14 RX ADMIN — HYDROMORPHONE HYDROCHLORIDE 0.5 MG: 1 INJECTION, SOLUTION INTRAMUSCULAR; INTRAVENOUS; SUBCUTANEOUS at 20:42

## 2021-10-14 RX ADMIN — PROPOFOL 50 MG: 10 INJECTION, EMULSION INTRAVENOUS at 12:17

## 2021-10-14 RX ADMIN — HYDROMORPHONE HYDROCHLORIDE 0.5 MG: 1 INJECTION, SOLUTION INTRAMUSCULAR; INTRAVENOUS; SUBCUTANEOUS at 12:46

## 2021-10-14 RX ADMIN — LIDOCAINE HYDROCHLORIDE 50 MG: 10 INJECTION, SOLUTION EPIDURAL; INFILTRATION; INTRACAUDAL at 12:15

## 2021-10-14 RX ADMIN — SODIUM CHLORIDE: 0.9 INJECTION, SOLUTION INTRAVENOUS at 12:10

## 2021-10-14 RX ADMIN — SODIUM CHLORIDE 8 MG/HR: 9 INJECTION, SOLUTION INTRAVENOUS at 09:42

## 2021-10-14 RX ADMIN — PANTOPRAZOLE SODIUM 40 MG: 40 INJECTION, POWDER, FOR SOLUTION INTRAVENOUS at 09:42

## 2021-10-14 RX ADMIN — SUCRALFATE 1 G: 1 TABLET ORAL at 18:34

## 2021-10-15 VITALS
TEMPERATURE: 98.7 F | HEIGHT: 66 IN | WEIGHT: 196.4 LBS | OXYGEN SATURATION: 95 % | DIASTOLIC BLOOD PRESSURE: 62 MMHG | RESPIRATION RATE: 18 BRPM | BODY MASS INDEX: 31.57 KG/M2 | SYSTOLIC BLOOD PRESSURE: 113 MMHG | HEART RATE: 107 BPM

## 2021-10-15 LAB
ANION GAP SERPL CALCULATED.3IONS-SCNC: 7 MMOL/L (ref 4–13)
BASOPHILS # BLD AUTO: 0.02 THOUSANDS/ΜL (ref 0–0.1)
BASOPHILS NFR BLD AUTO: 1 % (ref 0–1)
BUN SERPL-MCNC: 5 MG/DL (ref 5–25)
CALCIUM SERPL-MCNC: 7.2 MG/DL (ref 8.3–10.1)
CHLORIDE SERPL-SCNC: 106 MMOL/L (ref 100–108)
CO2 SERPL-SCNC: 27 MMOL/L (ref 21–32)
CREAT SERPL-MCNC: 0.57 MG/DL (ref 0.6–1.3)
EOSINOPHIL # BLD AUTO: 0.05 THOUSAND/ΜL (ref 0–0.61)
EOSINOPHIL NFR BLD AUTO: 1 % (ref 0–6)
ERYTHROCYTE [DISTWIDTH] IN BLOOD BY AUTOMATED COUNT: 14.7 % (ref 11.6–15.1)
FOLATE SERPL-MCNC: 3.4 NG/ML (ref 3.1–17.5)
GFR SERPL CREATININE-BSD FRML MDRD: 113 ML/MIN/1.73SQ M
GLUCOSE P FAST SERPL-MCNC: 94 MG/DL (ref 65–99)
GLUCOSE SERPL-MCNC: 94 MG/DL (ref 65–140)
HCT VFR BLD AUTO: 23.8 % (ref 34.8–46.1)
HCT VFR BLD AUTO: 26.8 % (ref 34.8–46.1)
HGB BLD-MCNC: 7.6 G/DL (ref 11.5–15.4)
HGB BLD-MCNC: 8.7 G/DL (ref 11.5–15.4)
IMM GRANULOCYTES # BLD AUTO: 0.03 THOUSAND/UL (ref 0–0.2)
IMM GRANULOCYTES NFR BLD AUTO: 1 % (ref 0–2)
LYMPHOCYTES # BLD AUTO: 1.34 THOUSANDS/ΜL (ref 0.6–4.47)
LYMPHOCYTES NFR BLD AUTO: 36 % (ref 14–44)
MCH RBC QN AUTO: 39.4 PG (ref 26.8–34.3)
MCHC RBC AUTO-ENTMCNC: 31.9 G/DL (ref 31.4–37.4)
MCV RBC AUTO: 123 FL (ref 82–98)
MONOCYTES # BLD AUTO: 0.15 THOUSAND/ΜL (ref 0.17–1.22)
MONOCYTES NFR BLD AUTO: 4 % (ref 4–12)
NEUTROPHILS # BLD AUTO: 2.14 THOUSANDS/ΜL (ref 1.85–7.62)
NEUTS SEG NFR BLD AUTO: 57 % (ref 43–75)
NRBC BLD AUTO-RTO: 0 /100 WBCS
PLATELET # BLD AUTO: 198 THOUSANDS/UL (ref 149–390)
PMV BLD AUTO: 9.8 FL (ref 8.9–12.7)
POTASSIUM SERPL-SCNC: 3.2 MMOL/L (ref 3.5–5.3)
RBC # BLD AUTO: 1.93 MILLION/UL (ref 3.81–5.12)
SODIUM SERPL-SCNC: 140 MMOL/L (ref 136–145)
VIT B12 SERPL-MCNC: 91 PG/ML (ref 100–900)
WBC # BLD AUTO: 3.73 THOUSAND/UL (ref 4.31–10.16)

## 2021-10-15 PROCEDURE — C9113 INJ PANTOPRAZOLE SODIUM, VIA: HCPCS | Performed by: INTERNAL MEDICINE

## 2021-10-15 PROCEDURE — 85018 HEMOGLOBIN: CPT | Performed by: PHYSICIAN ASSISTANT

## 2021-10-15 PROCEDURE — 85025 COMPLETE CBC W/AUTO DIFF WBC: CPT | Performed by: INTERNAL MEDICINE

## 2021-10-15 PROCEDURE — 82607 VITAMIN B-12: CPT | Performed by: PHYSICIAN ASSISTANT

## 2021-10-15 PROCEDURE — 99213 OFFICE O/P EST LOW 20 MIN: CPT | Performed by: INTERNAL MEDICINE

## 2021-10-15 PROCEDURE — 80048 BASIC METABOLIC PNL TOTAL CA: CPT | Performed by: INTERNAL MEDICINE

## 2021-10-15 PROCEDURE — 99217 PR OBSERVATION CARE DISCHARGE MANAGEMENT: CPT | Performed by: INTERNAL MEDICINE

## 2021-10-15 PROCEDURE — 82746 ASSAY OF FOLIC ACID SERUM: CPT | Performed by: PHYSICIAN ASSISTANT

## 2021-10-15 PROCEDURE — 85014 HEMATOCRIT: CPT | Performed by: PHYSICIAN ASSISTANT

## 2021-10-15 RX ORDER — PANTOPRAZOLE SODIUM 40 MG/1
40 TABLET, DELAYED RELEASE ORAL
Qty: 60 TABLET | Refills: 0 | Status: SHIPPED | OUTPATIENT
Start: 2021-10-15 | End: 2021-10-21

## 2021-10-15 RX ORDER — BUPROPION HYDROCHLORIDE 75 MG/1
75 TABLET ORAL DAILY
Status: DISCONTINUED | OUTPATIENT
Start: 2021-10-15 | End: 2021-10-15 | Stop reason: HOSPADM

## 2021-10-15 RX ORDER — SUCRALFATE 1 G/1
1 TABLET ORAL EVERY 6 HOURS
Qty: 120 TABLET | Refills: 0 | Status: SHIPPED | OUTPATIENT
Start: 2021-10-15

## 2021-10-15 RX ORDER — POTASSIUM CHLORIDE 20 MEQ/1
40 TABLET, EXTENDED RELEASE ORAL 2 TIMES DAILY
Status: DISCONTINUED | OUTPATIENT
Start: 2021-10-15 | End: 2021-10-15 | Stop reason: HOSPADM

## 2021-10-15 RX ADMIN — BUPROPION HYDROCHLORIDE 75 MG: 75 TABLET ORAL at 09:23

## 2021-10-15 RX ADMIN — SUCRALFATE 1 G: 1 TABLET ORAL at 06:20

## 2021-10-15 RX ADMIN — CITALOPRAM HYDROBROMIDE 20 MG: 20 TABLET ORAL at 08:39

## 2021-10-15 RX ADMIN — ACETAMINOPHEN 650 MG: 325 TABLET, FILM COATED ORAL at 11:39

## 2021-10-15 RX ADMIN — SODIUM CHLORIDE 8 MG/HR: 9 INJECTION, SOLUTION INTRAVENOUS at 12:10

## 2021-10-15 RX ADMIN — SODIUM CHLORIDE 125 ML/HR: 0.9 INJECTION, SOLUTION INTRAVENOUS at 00:58

## 2021-10-15 RX ADMIN — SUCRALFATE 1 G: 1 TABLET ORAL at 12:38

## 2021-10-15 RX ADMIN — POTASSIUM CHLORIDE 40 MEQ: 1500 TABLET, EXTENDED RELEASE ORAL at 08:38

## 2021-10-15 RX ADMIN — SODIUM CHLORIDE 8 MG/HR: 9 INJECTION, SOLUTION INTRAVENOUS at 00:57

## 2021-10-21 DIAGNOSIS — K25.9 GASTRIC ULCER WITHOUT HEMORRHAGE OR PERFORATION, UNSPECIFIED CHRONICITY: ICD-10-CM

## 2021-10-21 RX ORDER — PANTOPRAZOLE SODIUM 40 MG/1
40 TABLET, DELAYED RELEASE ORAL
Qty: 60 TABLET | Refills: 2 | Status: SHIPPED | OUTPATIENT
Start: 2021-10-21 | End: 2021-11-20

## 2021-11-04 ENCOUNTER — TELEPHONE (OUTPATIENT)
Dept: GASTROENTEROLOGY | Facility: AMBULARY SURGERY CENTER | Age: 46
End: 2021-11-04

## 2022-11-22 ENCOUNTER — APPOINTMENT (EMERGENCY)
Dept: RADIOLOGY | Facility: HOSPITAL | Age: 47
End: 2022-11-22

## 2022-11-22 ENCOUNTER — HOSPITAL ENCOUNTER (INPATIENT)
Facility: HOSPITAL | Age: 47
LOS: 3 days | Discharge: HOME | End: 2022-11-25
Attending: EMERGENCY MEDICINE | Admitting: INTERNAL MEDICINE

## 2022-11-22 ENCOUNTER — APPOINTMENT (EMERGENCY)
Dept: ULTRASOUND IMAGING | Facility: HOSPITAL | Age: 47
End: 2022-11-22

## 2022-11-22 ENCOUNTER — APPOINTMENT (EMERGENCY)
Dept: CT IMAGING | Facility: HOSPITAL | Age: 47
End: 2022-11-22

## 2022-11-22 ENCOUNTER — TELEPHONE (OUTPATIENT)
Dept: CT IMAGING | Facility: HOSPITAL | Age: 47
End: 2022-11-22

## 2022-11-22 DIAGNOSIS — D69.6 PLATELETS DECREASED (HCC): ICD-10-CM

## 2022-11-22 DIAGNOSIS — D52.0 DIETARY FOLATE DEFICIENCY ANEMIA: ICD-10-CM

## 2022-11-22 DIAGNOSIS — R17 ELEVATED BILIRUBIN: ICD-10-CM

## 2022-11-22 DIAGNOSIS — R74.8 ELEVATED ALKALINE PHOSPHATASE LEVEL: ICD-10-CM

## 2022-11-22 DIAGNOSIS — F10.239 ALCOHOL DEPENDENCE WITH WITHDRAWAL WITH COMPLICATION (HCC): ICD-10-CM

## 2022-11-22 DIAGNOSIS — D51.9: ICD-10-CM

## 2022-11-22 DIAGNOSIS — F10.232 ALCOHOL DEPENDENCE WITH WITHDRAWAL WITH PERCEPTUAL DISTURBANCE (HCC): ICD-10-CM

## 2022-11-22 DIAGNOSIS — E51.2 WERNICKE'S ENCEPHALOPATHY: ICD-10-CM

## 2022-11-22 DIAGNOSIS — R53.1 GENERALIZED WEAKNESS: Primary | ICD-10-CM

## 2022-11-22 DIAGNOSIS — R16.0 HEPATOMEGALY: ICD-10-CM

## 2022-11-22 DIAGNOSIS — R00.0 TACHYCARDIA: ICD-10-CM

## 2022-11-22 PROBLEM — D53.9 MACROCYTIC ANEMIA: Status: ACTIVE | Noted: 2022-11-22

## 2022-11-22 PROBLEM — G93.41 ACUTE METABOLIC ENCEPHALOPATHY: Status: ACTIVE | Noted: 2022-11-22

## 2022-11-22 PROBLEM — R74.01 TRANSAMINITIS: Status: ACTIVE | Noted: 2022-11-22

## 2022-11-22 LAB
2HR DELTA HS TROPONIN: 0 NG/L
4HR DELTA HS TROPONIN: 0 NG/L
ALBUMIN SERPL BCP-MCNC: 3.1 G/DL (ref 3.5–5)
ALP SERPL-CCNC: 517 U/L (ref 34–104)
ALT SERPL W P-5'-P-CCNC: 36 U/L (ref 7–52)
ANION GAP SERPL CALCULATED.3IONS-SCNC: 15 MMOL/L (ref 4–13)
AST SERPL W P-5'-P-CCNC: 184 U/L (ref 13–39)
ATRIAL RATE: 121 BPM
BASOPHILS # BLD AUTO: 0.03 THOUSANDS/ÂΜL (ref 0–0.1)
BASOPHILS NFR BLD AUTO: 1 % (ref 0–1)
BILIRUB DIRECT SERPL-MCNC: 0.84 MG/DL (ref 0–0.2)
BILIRUB SERPL-MCNC: 1.91 MG/DL (ref 0.2–1)
BILIRUB UR QL STRIP: NEGATIVE
BUN SERPL-MCNC: 3 MG/DL (ref 5–25)
CALCIUM ALBUM COR SERPL-MCNC: 9.3 MG/DL (ref 8.3–10.1)
CALCIUM SERPL-MCNC: 8.6 MG/DL (ref 8.4–10.2)
CARDIAC TROPONIN I PNL SERPL HS: 9 NG/L
CHLORIDE SERPL-SCNC: 93 MMOL/L (ref 96–108)
CLARITY UR: CLEAR
CO2 SERPL-SCNC: 27 MMOL/L (ref 21–32)
COLOR UR: YELLOW
CREAT SERPL-MCNC: 0.45 MG/DL (ref 0.6–1.3)
EOSINOPHIL # BLD AUTO: 0.02 THOUSAND/ÂΜL (ref 0–0.61)
EOSINOPHIL NFR BLD AUTO: 0 % (ref 0–6)
ERYTHROCYTE [DISTWIDTH] IN BLOOD BY AUTOMATED COUNT: 14.7 % (ref 11.6–15.1)
ETHANOL EXG-MCNC: 0 MG/DL
EXT PREGNANCY TEST URINE: NEGATIVE
EXT. CONTROL: NORMAL
FLUAV RNA RESP QL NAA+PROBE: NEGATIVE
FLUBV RNA RESP QL NAA+PROBE: NEGATIVE
FOLATE SERPL-MCNC: 2.7 NG/ML (ref 3.1–17.5)
GFR SERPL CREATININE-BSD FRML MDRD: 121 ML/MIN/1.73SQ M
GLUCOSE SERPL-MCNC: 164 MG/DL (ref 65–140)
GLUCOSE SERPL-MCNC: 165 MG/DL (ref 65–140)
GLUCOSE UR STRIP-MCNC: NEGATIVE MG/DL
HCT VFR BLD AUTO: 32.9 % (ref 34.8–46.1)
HGB BLD-MCNC: 11.2 G/DL (ref 11.5–15.4)
HGB UR QL STRIP.AUTO: NEGATIVE
IMM GRANULOCYTES # BLD AUTO: 0.04 THOUSAND/UL (ref 0–0.2)
IMM GRANULOCYTES NFR BLD AUTO: 1 % (ref 0–2)
INR PPP: 1.01 (ref 0.84–1.19)
KETONES UR STRIP-MCNC: NEGATIVE MG/DL
LEUKOCYTE ESTERASE UR QL STRIP: NEGATIVE
LYMPHOCYTES # BLD AUTO: 0.63 THOUSANDS/ÂΜL (ref 0.6–4.47)
LYMPHOCYTES NFR BLD AUTO: 14 % (ref 14–44)
MCH RBC QN AUTO: 40 PG (ref 26.8–34.3)
MCHC RBC AUTO-ENTMCNC: 34 G/DL (ref 31.4–37.4)
MCV RBC AUTO: 118 FL (ref 82–98)
MONOCYTES # BLD AUTO: 0.16 THOUSAND/ÂΜL (ref 0.17–1.22)
MONOCYTES NFR BLD AUTO: 4 % (ref 4–12)
NEUTROPHILS # BLD AUTO: 3.6 THOUSANDS/ÂΜL (ref 1.85–7.62)
NEUTS SEG NFR BLD AUTO: 80 % (ref 43–75)
NITRITE UR QL STRIP: NEGATIVE
NRBC BLD AUTO-RTO: 1 /100 WBCS
P AXIS: 66 DEGREES
PH UR STRIP.AUTO: 7 [PH]
PLATELET # BLD AUTO: 98 THOUSANDS/UL (ref 149–390)
PMV BLD AUTO: 9.9 FL (ref 8.9–12.7)
POTASSIUM SERPL-SCNC: 3.3 MMOL/L (ref 3.5–5.3)
PR INTERVAL: 130 MS
PROT SERPL-MCNC: 6.2 G/DL (ref 6.4–8.4)
PROT UR STRIP-MCNC: NEGATIVE MG/DL
PROTHROMBIN TIME: 13.5 SECONDS (ref 11.6–14.5)
QRS AXIS: 72 DEGREES
QRSD INTERVAL: 76 MS
QT INTERVAL: 346 MS
QTC INTERVAL: 491 MS
RBC # BLD AUTO: 2.8 MILLION/UL (ref 3.81–5.12)
RSV RNA RESP QL NAA+PROBE: NEGATIVE
SARS-COV-2 RNA RESP QL NAA+PROBE: NEGATIVE
SODIUM SERPL-SCNC: 135 MMOL/L (ref 135–147)
SP GR UR STRIP.AUTO: 1.01 (ref 1–1.03)
T WAVE AXIS: 76 DEGREES
T4 FREE SERPL-MCNC: 0.88 NG/DL (ref 0.76–1.46)
TSH SERPL DL<=0.05 MIU/L-ACNC: 5.8 UIU/ML (ref 0.45–4.5)
UROBILINOGEN UR STRIP-ACNC: <2 MG/DL
VENTRICULAR RATE: 121 BPM
VIT B12 SERPL-MCNC: 229 PG/ML (ref 100–900)
WBC # BLD AUTO: 4.48 THOUSAND/UL (ref 4.31–10.16)

## 2022-11-22 RX ORDER — POTASSIUM CHLORIDE 20 MEQ/1
20 TABLET, EXTENDED RELEASE ORAL ONCE
Status: COMPLETED | OUTPATIENT
Start: 2022-11-22 | End: 2022-11-22

## 2022-11-22 RX ORDER — LORAZEPAM 2 MG/ML
1 INJECTION INTRAMUSCULAR ONCE
Status: COMPLETED | OUTPATIENT
Start: 2022-11-22 | End: 2022-11-22

## 2022-11-22 RX ORDER — LANOLIN ALCOHOL/MO/W.PET/CERES
100 CREAM (GRAM) TOPICAL ONCE
Status: COMPLETED | OUTPATIENT
Start: 2022-11-22 | End: 2022-11-22

## 2022-11-22 RX ORDER — LANOLIN ALCOHOL/MO/W.PET/CERES
100 CREAM (GRAM) TOPICAL DAILY
Status: DISCONTINUED | OUTPATIENT
Start: 2022-11-23 | End: 2022-11-22

## 2022-11-22 RX ORDER — PANTOPRAZOLE SODIUM 40 MG/1
40 TABLET, DELAYED RELEASE ORAL
Status: DISCONTINUED | OUTPATIENT
Start: 2022-11-23 | End: 2022-11-25 | Stop reason: HOSPADM

## 2022-11-22 RX ORDER — LORAZEPAM 1 MG/1
2 TABLET ORAL ONCE
Status: COMPLETED | OUTPATIENT
Start: 2022-11-22 | End: 2022-11-22

## 2022-11-22 RX ORDER — LANOLIN ALCOHOL/MO/W.PET/CERES
200 CREAM (GRAM) TOPICAL 3 TIMES DAILY
Status: DISCONTINUED | OUTPATIENT
Start: 2022-11-22 | End: 2022-11-23

## 2022-11-22 RX ORDER — FOLIC ACID 1 MG/1
1 TABLET ORAL DAILY
Status: DISCONTINUED | OUTPATIENT
Start: 2022-11-23 | End: 2022-11-25 | Stop reason: HOSPADM

## 2022-11-22 RX ADMIN — THIAMINE HCL TAB 100 MG 100 MG: 100 TAB at 15:56

## 2022-11-22 RX ADMIN — THIAMINE HCL TAB 100 MG 200 MG: 100 TAB at 21:15

## 2022-11-22 RX ADMIN — POTASSIUM CHLORIDE 20 MEQ: 1500 TABLET, EXTENDED RELEASE ORAL at 15:56

## 2022-11-22 RX ADMIN — LORAZEPAM 2 MG: 1 TABLET ORAL at 21:15

## 2022-11-22 RX ADMIN — LORAZEPAM 1 MG: 2 INJECTION INTRAMUSCULAR; INTRAVENOUS at 14:07

## 2022-11-22 RX ADMIN — LORAZEPAM 1 MG: 2 INJECTION INTRAMUSCULAR; INTRAVENOUS at 16:14

## 2022-11-22 RX ADMIN — SODIUM CHLORIDE 1000 ML: 0.9 INJECTION, SOLUTION INTRAVENOUS at 14:07

## 2022-11-22 NOTE — H&P
Lawanda Rosariocindy Do [facility] [address] 1975, 41 y o  female MRN: [id number]  Unit/Bed#: S -01 Encounter: [id number]  Primary Care Provider: Yas Lemus DO   Date and time admitted to hospital: 11/22/2022  1:05 PM    * Alcohol dependence with withdrawal [facility])  Assessment & Plan  Per family patient has been a heavy drinker for past 7-8 years since the passing of her   She drinks upwards of 1 bottle of wine daily  Last drink prior to admission was in the morning when she had one glass of wine  She denies any recent weaning of her alcohol use  She denies history of withdrawal symptoms  She is now presenting with acute anxiety, tactile hallucinations, and palpitations  Also demonstrating signs of Wernicke's encephalopathy  Plan:  Recommend complete abstinence from alcohol and possible inpatient rehab  CIWA protocol  Seizure precautions  Daily thiamine and folate  Daily multivitamin  Discontinue Bupropion (prescribed but pt states she was not taking it at home) due to decreased seizure threshold  Trend LFTs, platelets  Consulted psychiatry; appreciate recommendations    Wernicke's encephalopathy  Assessment & Plan  Pt presenting with nystagmus, ataxia and brain fog  Also showing cerebellar dysfunction on physical exam  Associated with heavy chronic alcohol use  Plan: Thiamine 200 mg TID  CIWA protocol  Fall precautions        Transaminitis  Assessment & Plan  Recent Labs     11/22/22  1354   *   ALT 36   ALKPHOS 517*   TBILI 1 91*   BILIDIR 0 84*     Likely alcohol induced liver injury rather than NAFLD    Plan:  See plan for Wernicke's encephalopathy  Trend LFTs    Macrocytic anemia  Assessment & Plan  Recent Labs     11/22/22  1354   HGB 11 2*       Pt has history of iron deficiency anemia due to poor oral intake of iron  She has history of B12 deficiency  CBC in early 2019 showing microcytic anemia with MCV 78   However this admission she is showing macrocytosis w/  Likely combination of heavy alcohol consumption with possible concurrent B12 and/or folate deficiency    Plan:  FU B12 and folate  Replete B12 if < 250  Daily 1 mg folate   Trend HgB  Transfuse if HgB < 7    Thrombocytopenia (HCC)  Assessment & Plan  Recent Labs     11/22/22  1354   PLT 98*     Likely 2/2 bone marrow suppression in the setting of chronic alcohol dependence  Trend CBC    Insomnia  Assessment & Plan  Likely 2/2 heavy alcohol use    Plan:  Melatonin qhs prn    VTE Prophylaxis: Encourage ambulation  / sequential compression device   Code Status: Full Code  POLST: POLST form is not discussed and not completed at this time  Discussion with family: MotherKiersten    Anticipated Length of Stay:  Patient will be admitted on an Inpatient basis with an anticipated length of stay of  > 2 midnights  Justification for Hospital Stay: Alcohol withdrawal    Total Time for Visit, including Counseling / Coordination of Care: 90 minutes  Greater than 50% of this total time spent on direct patient counseling and coordination of care  Chief Complaint:   Generalized weakness    History of Present Illness:    Janey Plascencia is a 41 y o  female w/PMHx alcohol use disorder who presents with anxiety, nervousness, brain fog x 2 days  She came to the ED because she "did not feel safe to drive " She drinks a bottle of wine per day  Last drink was this morning  She is a daily drinker for the past 7-8 years since the passing of her   She denies known history of withdrawal symptoms  She admits to chronic anxiety as well as insomnia but does not attribute this to her alcohol use  Review of Systems:    Review of Systems   Constitutional: Negative for chills and fever  HENT: Negative for ear pain and sore throat  Eyes: Negative for pain and visual disturbance  Respiratory: Negative for cough and shortness of breath      Cardiovascular: Negative for chest pain and palpitations  Gastrointestinal: Negative for abdominal pain and vomiting  Genitourinary: Negative for dysuria and hematuria  Musculoskeletal: Positive for gait problem  Negative for arthralgias and back pain  Skin: Negative for color change and rash  Neurological: Negative for dizziness, seizures, syncope and headaches  Hematological: Bruises/bleeds easily  Psychiatric/Behavioral: Positive for hallucinations and sleep disturbance  The patient is nervous/anxious  All other systems reviewed and are negative  Past Medical and Surgical History:     Past Medical History:   Diagnosis Date   • Anxiety    • Depression    • Gastric ulcer        Past Surgical History:   Procedure Laterality Date   •  SECTION     • CHOLECYSTECTOMY     • GASTRIC BYPASS         Meds/Allergies:    Prior to Admission medications    Medication Sig Start Date End Date Taking? Authorizing Provider   acetaminophen (TYLENOL) 325 mg tablet Take 2 tablets (650 mg total) by mouth every 6 (six) hours as needed for mild pain, headaches or fever 19   Klarissa Duran, DO   ALPRAZolam (XANAX) 1 mg tablet Take 1 mg by mouth as needed for anxiety      Historical Provider, MD   ergocalciferol (VITAMIN D2) 50,000 units Take 1 capsule (50,000 Units total) by mouth 3 (three) times a week 4/15/19   Klarissa Duran DO   lidocaine (LIDODERM) 5 % Apply 1 patch topically daily Remove & Discard patch within 12 hours or as directed by MD 19   Klarissa Duran, DO   Multiple Vitamins-Minerals (MULTIVITAMIN WITH IRON-MINERALS) liquid Take 15 mL by mouth daily 19   Klarissa Duran DO   pantoprazole (PROTONIX) 40 mg tablet Take 1 tablet (40 mg total) by mouth 2 (two) times a day before meals 10/21/21 11/20/21  JANEE Lewis   sucralfate (CARAFATE) 1 g tablet Take 1 tablet (1 g total) by mouth every 6 (six) hours 10/15/21   Brian Tobin MD   buPROPion [facility]) 75 mg tablet Take 75 mg by mouth daily  22  Historical Provider, MD WATKINS have reviewed home medications with patient personally  Allergies: Allergies   Allergen Reactions   • Motrin [Ibuprofen]        Social History:     Marital Status:    Occupation: Medical  for insurance  Patient Pre-hospital Living Situation: Lives with 13year old daughter  Patient Pre-hospital Level of Mobility: No  Patient Pre-hospital Diet Restrictions: No    Substance Use History:   Social History     Substance and Sexual Activity   Alcohol Use Yes   • Alcohol/week: 5 0 standard drinks   • Types: 5 Glasses of wine per week     Social History     Tobacco Use   Smoking Status Some Days   • Types: Cigarettes   Smokeless Tobacco Never   Tobacco Comments    4 cigaretts a day     Social History     Substance and Sexual Activity   Drug Use No       Family History:    non-contributory    Physical Exam:     Vitals:   Blood Pressure: 124/90 (11/22/22 1800)  Pulse: (!) 132 (11/22/22 1800)  Temperature: 99 4 °F (37 4 °C) (11/22/22 1800)  Temp Source: Oral (11/22/22 1800)  Respirations: 18 (11/22/22 1800)  Height: 5' 6" (167 6 cm) (11/22/22 1800)  Weight - Scale: 87 kg (191 lb 12 8 oz) (11/22/22 1800)  SpO2: 96 % (11/22/22 1800)    Physical Exam  Vitals and nursing note reviewed  Constitutional:       General: She is not in acute distress  Appearance: Normal appearance  She is obese  She is diaphoretic  She is not ill-appearing  HENT:      Head: Normocephalic and atraumatic  Right Ear: External ear normal       Left Ear: External ear normal       Mouth/Throat:      Mouth: Mucous membranes are moist       Pharynx: Oropharynx is clear  Eyes:      Extraocular Movements: Extraocular movements intact  Right eye: Nystagmus present  Normal extraocular motion  Left eye: Nystagmus present  Normal extraocular motion  Conjunctiva/sclera: Conjunctivae normal       Pupils: Pupils are equal, round, and reactive to light  Cardiovascular:      Rate and Rhythm: Regular rhythm   Tachycardia present  Pulses: Normal pulses  Heart sounds: Normal heart sounds  Pulmonary:      Effort: Pulmonary effort is normal  No respiratory distress  Breath sounds: Normal breath sounds  Abdominal:      General: Abdomen is flat  Bowel sounds are normal       Palpations: Abdomen is soft  Musculoskeletal:         General: No swelling, tenderness or deformity  Right lower leg: Edema present  Left lower leg: Edema present  Skin:     General: Skin is warm  Capillary Refill: Capillary refill takes less than 2 seconds  Neurological:      Mental Status: She is alert and oriented to person, place, and time  Cranial Nerves: Cranial nerves 2-12 are intact  No dysarthria  Motor: Tremor present  No seizure activity or pronator drift  Coordination: Finger-Nose-Finger Test abnormal and Heel to [employer] abnormal    Psychiatric:         Mood and Affect: Mood normal          Behavior: Behavior normal          Additional Data:     Lab Results: I have personally reviewed pertinent reports  Results from last 7 days   Lab Units 11/22/22  1354   WBC Thousand/uL 4 48   HEMOGLOBIN g/dL 11 2*   HEMATOCRIT % 32 9*   PLATELETS Thousands/uL 98*   NEUTROS PCT % 80*   LYMPHS PCT % 14   MONOS PCT % 4   EOS PCT % 0     Results from last 7 days   Lab Units 11/22/22  1354   SODIUM mmol/L 135   POTASSIUM mmol/L 3 3*   CHLORIDE mmol/L 93*   CO2 mmol/L 27   BUN mg/dL 3*   CREATININE mg/dL 0 45*   ANION GAP mmol/L 15*   CALCIUM mg/dL 8 6   ALBUMIN g/dL 3 1*   TOTAL BILIRUBIN mg/dL 1 91*   ALK PHOS U/L 517*   ALT U/L 36   AST U/L 184*   GLUCOSE RANDOM mg/dL 165*         Results from last 7 days   Lab Units 11/22/22  1354   POC GLUCOSE mg/dl 164*               Imaging: I have personally reviewed pertinent reports  US right upper quadrant   Final Result by Laila Nascimento MD (11/22 1623)      1    Severe hepatomegaly with moderate hepatic steatosis and a mildly nodular liver surface contour, suggesting early cirrhotic changes  2   Status post cholecystectomy  Workstation performed: [id number]         CT head without contrast   Final Result by Luciana Duran MD (11/22 3511)      No acute intracranial abnormality  Workstation performed: [id number]         XR chest 1 view portable   ED Interpretation by Angel Ward DO (11/22 7846)   No acute cardiopulmonary disease  EKG, Pathology, and Other Studies Reviewed on Admission:   · EKG: Sinus tachycardia with occasional PVC    Allscripts / Epic Records Reviewed: Yes     ** Please Note: This note has been constructed using a voice recognition system   **

## 2022-11-22 NOTE — ED PROVIDER NOTES
History  Chief Complaint   Patient presents with   • Weakness - Generalized     Pt states she is weak lately and shaky, loss of appetite, brain fog, trouble sleeping, anxious, and thirsty no matter how much she drinks  The patient is a 53-year-old female with a past medical history of anxiety, depression and stomach ulcer who presents to emergency department with complaint of generalized weakness  The patient reports that over the past 3-4 weeks she has been experiencing shaking, decreased appetite, increased thirst and sleepiness  She reports that she has increased her water consumption to 6 or 7 bottles of water daily and continues to feel thirsty  She came to the emergency department today because of increased confusion and “brain fog”  She reports that she often feels confused and forgetful  She reports that there has been a lot going on her private life  Her   11 years ago, her father had a stroke 3 years ago and she has not taken care of him and her daughter has recently been having behavioral health issues  She reports that her heart rate has been elevated above 100 multiple times over the past year and she is growing continually concerned  The patient also endorses drinking 3-4 glasses a wine daily for the past year  Prior to Admission Medications   Prescriptions Last Dose Informant Patient Reported? Taking? ALPRAZolam (XANAX) 1 mg tablet   Yes No   Sig: Take 1 mg by mouth as needed for anxiety     Multiple Vitamins-Minerals (MULTIVITAMIN WITH IRON-MINERALS) liquid   No No   Sig: Take 15 mL by mouth daily   acetaminophen (TYLENOL) 325 mg tablet   No No   Sig: Take 2 tablets (650 mg total) by mouth every 6 (six) hours as needed for mild pain, headaches or fever   buPROPion (WELLBUTRIN) 75 mg tablet   Yes No   Sig: Take 75 mg by mouth daily   ergocalciferol (VITAMIN D2) 50,000 units   No No   Sig: Take 1 capsule (50,000 Units total) by mouth 3 (three) times a week   lidocaine (LIDODERM) 5 %   No No   Sig: Apply 1 patch topically daily Remove & Discard patch within 12 hours or as directed by MD   pantoprazole (PROTONIX) 40 mg tablet   No No   Sig: Take 1 tablet (40 mg total) by mouth 2 (two) times a day before meals   sucralfate (CARAFATE) 1 g tablet   No No   Sig: Take 1 tablet (1 g total) by mouth every 6 (six) hours      Facility-Administered Medications: None       Past Medical History:   Diagnosis Date   • Anxiety    • Depression    • Gastric ulcer        Past Surgical History:   Procedure Laterality Date   •  SECTION     • CHOLECYSTECTOMY     • GASTRIC BYPASS         No family history on file  I have reviewed and agree with the history as documented  E-Cigarette/Vaping   • E-Cigarette Use Never User      E-Cigarette/Vaping Substances     Social History     Tobacco Use   • Smoking status: Some Days     Types: Cigarettes   • Smokeless tobacco: Never   • Tobacco comments:     4 cigaretts a day   Vaping Use   • Vaping Use: Never used   Substance Use Topics   • Alcohol use: Yes     Alcohol/week: 5 0 standard drinks     Types: 5 Glasses of wine per week   • Drug use: No        Review of Systems   Constitutional: Positive for appetite change, diaphoresis and fatigue  Negative for chills and fever  HENT: Negative for congestion, rhinorrhea and sore throat  Eyes: Negative for photophobia, pain and visual disturbance  Respiratory: Negative for cough, shortness of breath and stridor  Cardiovascular: Negative for chest pain, palpitations and leg swelling  Gastrointestinal: Negative for abdominal distention, abdominal pain, nausea and vomiting  Endocrine: Positive for polydipsia  Genitourinary: Negative for dysuria and hematuria  Musculoskeletal: Negative for back pain, neck pain and neck stiffness  Skin: Negative for color change and wound  Allergic/Immunologic: Negative  Neurological: Positive for tremors   Negative for dizziness, seizures, syncope, facial asymmetry, weakness, light-headedness, numbness and headaches  Hematological: Negative  Psychiatric/Behavioral: Negative  Physical Exam  ED Triage Vitals   Temperature Pulse Respirations Blood Pressure SpO2   11/22/22 1310 11/22/22 1310 11/22/22 1310 11/22/22 1310 11/22/22 1310   98 7 °F (37 1 °C) (!) 136 20 147/86 97 %      Temp Source Heart Rate Source Patient Position - Orthostatic VS BP Location FiO2 (%)   11/22/22 1310 11/22/22 1615 11/22/22 1310 11/22/22 1310 --   Oral Monitor Lying Right arm       Pain Score       --                    Orthostatic Vital Signs  Vitals:    11/22/22 1310 11/22/22 1330 11/22/22 1615   BP: 147/86 140/75 122/66   Pulse: (!) 136 (!) 122 (!) 132   Patient Position - Orthostatic VS: Lying  Lying       Physical Exam  Vitals and nursing note reviewed  Constitutional:       General: She is not in acute distress  Appearance: Normal appearance  She is well-developed and normal weight  She is not ill-appearing  HENT:      Head: Normocephalic and atraumatic  Nose: Nose normal       Mouth/Throat:      Mouth: Mucous membranes are moist       Pharynx: Oropharynx is clear  Eyes:      Extraocular Movements: Extraocular movements intact  Conjunctiva/sclera: Conjunctivae normal       Pupils: Pupils are equal, round, and reactive to light  Cardiovascular:      Rate and Rhythm: Regular rhythm  Tachycardia present  Pulses: Normal pulses  Heart sounds: Normal heart sounds  No murmur heard  No friction rub  Pulmonary:      Effort: Pulmonary effort is normal  No respiratory distress  Breath sounds: Normal breath sounds  No stridor  No wheezing, rhonchi or rales  Abdominal:      General: Abdomen is flat  Bowel sounds are normal  There is no distension  Palpations: Abdomen is soft  Tenderness: There is no abdominal tenderness  There is no guarding or rebound  Musculoskeletal:         General: No swelling or tenderness   Normal range of motion  Cervical back: Normal range of motion and neck supple  No rigidity or tenderness  Right lower leg: No edema  Left lower leg: No edema  Lymphadenopathy:      Cervical: No cervical adenopathy  Skin:     General: Skin is warm and dry  Capillary Refill: Capillary refill takes less than 2 seconds  Coloration: Skin is not jaundiced  Findings: No erythema or rash  Neurological:      General: No focal deficit present  Mental Status: She is alert and oriented to person, place, and time  Mental status is at baseline  Cranial Nerves: No cranial nerve deficit  Sensory: No sensory deficit  Motor: No weakness  Psychiatric:         Mood and Affect: Mood normal          ED Medications  Medications   sodium chloride 0 9 % bolus 1,000 mL (0 mL Intravenous Stopped 11/22/22 1618)   LORazepam (ATIVAN) injection 1 mg (1 mg Intravenous Given 11/22/22 1407)   thiamine tablet 100 mg (100 mg Oral Given 11/22/22 1556)   potassium chloride (K-DUR,KLOR-CON) CR tablet 20 mEq (20 mEq Oral Given 11/22/22 1556)   LORazepam (ATIVAN) injection 1 mg (1 mg Intravenous Given 11/22/22 1614)       Diagnostic Studies  Results Reviewed     Procedure Component Value Units Date/Time    Folate [[id number]] Collected: 11/22/22    Lab Status: In process Specimen: Blood Updated: 11/22/22 1649    Vitamin B12 [[id number]] Collected: 11/22/22    Lab Status:  In process Specimen: Blood Updated: 11/22/22 1649    POCT alcohol breath test [[id number]]  (Normal) Resulted: 11/22/22 1638    Lab Status: Final result Updated: 11/22/22 1638     EXTBreath Alcohol 0 00    HS Troponin I 2hr [[id number]]  (Normal) Collected: 11/22/22    Lab Status: Final result Specimen: Blood Updated: 11/22/22 1636     hs TnI 2hr 9 ng/L      Delta 2hr hsTnI 0 ng/L     UA (URINE) with reflex to Scope [[id number]] Collected: 11/22/22 1527    Lab Status: Final result Specimen: Urine, Clean Catch Updated: 11/22/22 1626     Color, UA Yellow Clarity, UA Clear     Specific Gravity, UA 1 008     pH, UA 7 0     Leukocytes, UA Negative     Nitrite, UA Negative     Protein, UA Negative mg/dl      Glucose, UA Negative mg/dl      Ketones, UA Negative mg/dl      Urobilinogen, UA <2 0 mg/dl      Bilirubin, UA Negative     Occult Blood, UA Negative    POCT pregnancy, urine [[id number]]  (Normal) Resulted: 11/22/22 1624    Lab Status: Final result Updated: 11/22/22 1624     EXT Preg Test, Ur Negative     Control Valid    FLU/RSV/COVID - if FLU/RSV clinically relevant [[id number]] Collected: 11/22/22 1613    Lab Status:  In process Specimen: Nares from Nose Updated: 11/22/22 1624    HS Troponin I 4hr [[id number]]     Lab Status: No result Specimen: Blood     CBC and differential [[id number]]  (Abnormal) Collected: 11/22/22 1354    Lab Status: Final result Specimen: Blood from Arm, Left Updated: 11/22/22 1521     WBC 4 48 Thousand/uL      RBC 2 80 Million/uL      Hemoglobin 11 2 g/dL      Hematocrit 32 9 %       fL      MCH 40 0 pg      MCHC 34 0 g/dL      RDW 14 7 %      MPV 9 9 fL      Platelets 98 Thousands/uL      nRBC 1 /100 WBCs      Neutrophils Relative 80 %      Immat GRANS % 1 %      Lymphocytes Relative 14 %      Monocytes Relative 4 %      Eosinophils Relative 0 %      Basophils Relative 1 %      Neutrophils Absolute 3 60 Thousands/µL      Immature Grans Absolute 0 04 Thousand/uL      Lymphocytes Absolute 0 63 Thousands/µL      Monocytes Absolute 0 16 Thousand/µL      Eosinophils Absolute 0 02 Thousand/µL      Basophils Absolute 0 03 Thousands/µL     Bilirubin, direct [[id number]]     Lab Status: No result Specimen: Blood     TSH, 3rd generation with Free T4 reflex [[id number]]  (Abnormal) Collected: 11/22/22    Lab Status: Final result Specimen: Blood Updated: 11/22/22 1501     TSH 3RD GENERATON 5 798 uIU/mL     Narrative:      Patients undergoing fluorescein dye angiography may retain small amounts of fluorescein in the body for 48-72 hours post procedure  Samples containing fluorescein can produce falsely depressed TSH values  If the patient had this procedure,a specimen should be resubmitted post fluorescein clearance  T4, free [[id number]] Collected: 11/22/22    Lab Status: In process Specimen: Blood Updated: 11/22/22 1501    HS Troponin 0hr (reflex protocol) [[id number]]  (Normal) Collected: 11/22/22 1354    Lab Status: Final result Specimen: Blood from Arm, Left Updated: 11/22/22 1429     hs TnI 0hr 9 ng/L     Comprehensive metabolic panel [[id number]]  (Abnormal) Collected: 11/22/22 1354    Lab Status: Final result Specimen: Blood from Arm, Left Updated: 11/22/22 1424     Sodium 135 mmol/L      Potassium 3 3 mmol/L      Chloride 93 mmol/L      CO2 27 mmol/L      ANION GAP 15 mmol/L      BUN 3 mg/dL      Creatinine 0 45 mg/dL      Glucose 165 mg/dL      Calcium 8 6 mg/dL      Corrected Calcium 9 3 mg/dL       U/L      ALT 36 U/L      Alkaline Phosphatase 517 U/L      Total Protein 6 2 g/dL      Albumin 3 1 g/dL      Total Bilirubin 1 91 mg/dL      eGFR 121 ml/min/1 73sq m     Narrative:      [place] guidelines for Chronic Kidney Disease (CKD):   •  Stage 1 with normal or high GFR (GFR > 90 mL/min/1 73 square meters)  •  Stage 2 Mild CKD (GFR = 60-89 mL/min/1 73 square meters)  •  Stage 3A Moderate CKD (GFR = 45-59 mL/min/1 73 square meters)  •  Stage 3B Moderate CKD (GFR = 30-44 mL/min/1 73 square meters)  •  Stage 4 Severe CKD (GFR = 15-29 mL/min/1 73 square meters)  •  Stage 5 End Stage CKD (GFR <15 mL/min/1 73 square meters)  Note: GFR calculation is accurate only with a steady state creatinine    Fingerstick Glucose (POCT) [[id number]]  (Abnormal) Collected: 11/22/22 1354    Lab Status: Final result Updated: 11/22/22 1358     POC Glucose 164 mg/dl                  US right upper quadrant   Final Result by Andrew Arnold MD (11/22 1623)      1    Severe hepatomegaly with moderate hepatic steatosis and a mildly nodular liver surface contour, suggesting early cirrhotic changes  2   Status post cholecystectomy  Workstation performed: [id number]         CT head without contrast   Final Result by Jeremy Santana MD (11/22 4691)      No acute intracranial abnormality  Workstation performed: [id number]         XR chest 1 view portable   ED Interpretation by Alexander Gu DO (11/22 1406)   No acute cardiopulmonary disease  Procedures  ECG 12 Lead Documentation Only    Date/Time: 11/22/2022 1:36 PM  Performed by: Alexander Gu DO  Authorized by: Alexander Gu DO     Indications / Diagnosis:  Tachycardia  ECG reviewed by me, the ED Provider: yes    Patient location:  ED  Previous ECG:     Previous ECG:  Compared to current    Similarity:  No change    Comparison to cardiac monitor: No    Interpretation:     Interpretation: abnormal    Quality:     Tracing quality:  Limited by artifact  Rate:     ECG rate:  121    ECG rate assessment: tachycardic    Rhythm:     Rhythm: sinus tachycardia    Ectopy:     Ectopy: PVCs      PVCs:  Infrequent  QRS:     QRS axis:  Normal    QRS intervals:  Normal  Conduction:     Conduction: normal    ST segments:     ST segments:  Normal  T waves:     T waves: normal    Comments:      Sinus tachycardia with an occasional PVC  The patient denies chest pain or shortness of breath at this time  ED Course  ED Course as of 11/22/22 1652   Tue Nov 22, 2022   1406 XR chest 1 view portable  No acute cardiopulmonary disease  1406 POC Glucose(!): 164  Non concerning for DKA  1407 ECG 12 lead  Sinus tachycardia with occasional PVCs  Patient denies chest pain or shortness of breath at this time  1515 CT head without contrast  No acute intracranial abnormality  1651 I discussed the case with Dr Perlita Downs who agreed to admission as inpatient at this time for further workup                                         MDM  Number of Diagnoses or Management Options  Diagnosis management comments: The patient is a 29-year-old female with a past medical history of anxiety, depression and stomach ulcer who presents to emergency department with complaint of generalized weakness  Upon initial presentation the patient was alert and oriented x4 and in no acute distress  Her physical exam was grossly unremarkable  I have ordered a CBC, CMP, UA, CT scan of the head and TSH at this time  Labs imaging pending  Disposition  Final diagnoses:   Generalized weakness   Tachycardia   Elevated alkaline phosphatase level   Elevated bilirubin   Platelets decreased ([address] )     Time reflects when diagnosis was documented in both MDM as applicable and the Disposition within this note     Time User Action Codes Description Comment    11/22/2022  4:49 PM Perlie Ran Add [R53 1] Generalized weakness     11/22/2022  4:49 PM [place] Friend, Kev Add [R00 0] Tachycardia     11/22/2022  4:49 PM Giacomo Friend, Kev Add [R74 8] Elevated alkaline phosphatase level     11/22/2022  4:49 PM Perlie Ran Add [R17] Elevated bilirubin     11/22/2022  4:50 PM Perlie Ran Add [D69 6] Platelets decreased [facility])       ED Disposition     ED Disposition   Admit    Condition   Stable    Date/Time   Tue Nov 22, 2022  4:49 PM    Comment   Case was discussed with Dr Kellen Morales and the patient's admission status was agreed to be Admission Status: inpatient status to the service of Dr Kellen Morales   Follow-up Information    None         Patient's Medications   Discharge Prescriptions    No medications on file     No discharge procedures on file  PDMP Review     None           ED Provider  Attending physically available and evaluated Camineeraj Garcia  JUNE managed the patient along with the ED Attending      Electronically Signed by         Gerardo Sneed DO  11/22/22 4203

## 2022-11-23 LAB
ALBUMIN SERPL BCP-MCNC: 2.5 G/DL (ref 3.5–5)
ALP SERPL-CCNC: 389 U/L (ref 34–104)
ALT SERPL W P-5'-P-CCNC: 29 U/L (ref 7–52)
ANION GAP SERPL CALCULATED.3IONS-SCNC: 5 MMOL/L (ref 4–13)
AST SERPL W P-5'-P-CCNC: 169 U/L (ref 13–39)
BILIRUB SERPL-MCNC: 2.15 MG/DL (ref 0.2–1)
BUN SERPL-MCNC: 2 MG/DL (ref 5–25)
CALCIUM ALBUM COR SERPL-MCNC: 8.8 MG/DL (ref 8.3–10.1)
CALCIUM SERPL-MCNC: 7.6 MG/DL (ref 8.4–10.2)
CHLORIDE SERPL-SCNC: 99 MMOL/L (ref 96–108)
CO2 SERPL-SCNC: 31 MMOL/L (ref 21–32)
CREAT SERPL-MCNC: 0.42 MG/DL (ref 0.6–1.3)
ERYTHROCYTE [DISTWIDTH] IN BLOOD BY AUTOMATED COUNT: 14.6 % (ref 11.6–15.1)
GFR SERPL CREATININE-BSD FRML MDRD: 124 ML/MIN/1.73SQ M
GLUCOSE SERPL-MCNC: 124 MG/DL (ref 65–140)
HCT VFR BLD AUTO: 28.4 % (ref 34.8–46.1)
HGB BLD-MCNC: 9.4 G/DL (ref 11.5–15.4)
MAGNESIUM SERPL-MCNC: 1.8 MG/DL (ref 1.9–2.7)
MCH RBC QN AUTO: 39.3 PG (ref 26.8–34.3)
MCHC RBC AUTO-ENTMCNC: 33.1 G/DL (ref 31.4–37.4)
MCV RBC AUTO: 119 FL (ref 82–98)
PLATELET # BLD AUTO: 83 THOUSANDS/UL (ref 149–390)
PMV BLD AUTO: 11 FL (ref 8.9–12.7)
POTASSIUM SERPL-SCNC: 3 MMOL/L (ref 3.5–5.3)
PROT SERPL-MCNC: 5.1 G/DL (ref 6.4–8.4)
RBC # BLD AUTO: 2.39 MILLION/UL (ref 3.81–5.12)
SODIUM SERPL-SCNC: 135 MMOL/L (ref 135–147)
WBC # BLD AUTO: 3.22 THOUSAND/UL (ref 4.31–10.16)

## 2022-11-23 RX ORDER — LORAZEPAM 2 MG/ML
1 INJECTION INTRAMUSCULAR ONCE
Status: COMPLETED | OUTPATIENT
Start: 2022-11-23 | End: 2022-11-23

## 2022-11-23 RX ORDER — POTASSIUM CHLORIDE 20 MEQ/1
40 TABLET, EXTENDED RELEASE ORAL ONCE
Status: COMPLETED | OUTPATIENT
Start: 2022-11-23 | End: 2022-11-23

## 2022-11-23 RX ORDER — CYANOCOBALAMIN 1000 UG/ML
1000 INJECTION, SOLUTION INTRAMUSCULAR; SUBCUTANEOUS ONCE
Status: COMPLETED | OUTPATIENT
Start: 2022-11-23 | End: 2022-11-23

## 2022-11-23 RX ORDER — LORAZEPAM 1 MG/1
2 TABLET ORAL ONCE
Status: CANCELLED | OUTPATIENT
Start: 2022-11-23 | End: 2022-11-23

## 2022-11-23 RX ORDER — LIDOCAINE 50 MG/G
1 PATCH TOPICAL DAILY
Status: DISCONTINUED | OUTPATIENT
Start: 2022-11-23 | End: 2022-11-25 | Stop reason: HOSPADM

## 2022-11-23 RX ORDER — CHLORDIAZEPOXIDE HYDROCHLORIDE 5 MG/1
10 CAPSULE, GELATIN COATED ORAL EVERY 8 HOURS SCHEDULED
Status: DISCONTINUED | OUTPATIENT
Start: 2022-11-23 | End: 2022-11-25 | Stop reason: HOSPADM

## 2022-11-23 RX ORDER — MAGNESIUM SULFATE HEPTAHYDRATE 40 MG/ML
2 INJECTION, SOLUTION INTRAVENOUS ONCE
Status: COMPLETED | OUTPATIENT
Start: 2022-11-23 | End: 2022-11-23

## 2022-11-23 RX ORDER — LORAZEPAM 1 MG/1
2 TABLET ORAL ONCE
Status: COMPLETED | OUTPATIENT
Start: 2022-11-23 | End: 2022-11-23

## 2022-11-23 RX ADMIN — MAGNESIUM SULFATE HEPTAHYDRATE 2 G: 40 INJECTION, SOLUTION INTRAVENOUS at 08:29

## 2022-11-23 RX ADMIN — LORAZEPAM 1 MG: 2 INJECTION INTRAMUSCULAR; INTRAVENOUS at 16:03

## 2022-11-23 RX ADMIN — FOLIC ACID 1 MG: 1 TABLET ORAL at 08:28

## 2022-11-23 RX ADMIN — PANTOPRAZOLE SODIUM 40 MG: 40 TABLET, DELAYED RELEASE ORAL at 06:28

## 2022-11-23 RX ADMIN — POTASSIUM CHLORIDE 40 MEQ: 1500 TABLET, EXTENDED RELEASE ORAL at 08:29

## 2022-11-23 RX ADMIN — CHLORDIAZEPOXIDE HYDROCHLORIDE 10 MG: 5 CAPSULE ORAL at 21:17

## 2022-11-23 RX ADMIN — THIAMINE HYDROCHLORIDE 500 MG: 100 INJECTION, SOLUTION INTRAMUSCULAR; INTRAVENOUS at 20:45

## 2022-11-23 RX ADMIN — POTASSIUM CHLORIDE 40 MEQ: 1500 TABLET, EXTENDED RELEASE ORAL at 12:20

## 2022-11-23 RX ADMIN — THIAMINE HCL TAB 100 MG 200 MG: 100 TAB at 08:29

## 2022-11-23 RX ADMIN — LIDOCAINE 5% 1 PATCH: 700 PATCH TOPICAL at 16:04

## 2022-11-23 RX ADMIN — CYANOCOBALAMIN 1000 MCG: 1000 INJECTION, SOLUTION INTRAMUSCULAR; SUBCUTANEOUS at 16:03

## 2022-11-23 RX ADMIN — MULTIPLE VITAMINS W/ MINERALS TAB 1 TABLET: TAB ORAL at 08:29

## 2022-11-23 RX ADMIN — CHLORDIAZEPOXIDE HYDROCHLORIDE 10 MG: 5 CAPSULE ORAL at 18:09

## 2022-11-23 RX ADMIN — THIAMINE HYDROCHLORIDE 500 MG: 100 INJECTION, SOLUTION INTRAMUSCULAR; INTRAVENOUS at 18:09

## 2022-11-23 RX ADMIN — LORAZEPAM 2 MG: 1 TABLET ORAL at 20:44

## 2022-11-23 NOTE — ASSESSMENT & PLAN NOTE
Recent Labs     11/22/22  1354   *   ALT 36   ALKPHOS 517*   TBILI 1 91*   BILIDIR 0 84*     Likely alcohol induced liver injury rather than NAFLD    Plan:  See plan for Wernicke's encephalopathy  Trend LFTs

## 2022-11-23 NOTE — ASSESSMENT & PLAN NOTE
Recent Labs     11/22/22  1354 11/23/22  0513   HGB 11 2* 9 4*     Lab Results   Component Value Date    [id number] 229 11/22/2022       Pt has history of iron deficiency anemia due to poor oral intake of iron  She has history of B12 deficiency  CBC in early 2019 showing microcytic anemia with MCV 78  However this admission she is showing macrocytosis w/  Likely combination of heavy alcohol consumption with possible concurrent B12 and/or folate deficiency    Plan:  Replete B12 if < 250  Daily 1 mg folate   Trend HgB   Transfuse if HgB < 7

## 2022-11-23 NOTE — ASSESSMENT & PLAN NOTE
Per family patient has been a heavy drinker for past 7-8 years since the passing of her   She drinks upwards of 1 bottle of wine daily  Last drink prior to admission was in the morning when she had one glass of wine  She denies any recent weaning of her alcohol use  She denies history of withdrawal symptoms  She is now presenting with acute anxiety, tactile hallucinations, and palpitations  Also demonstrating signs of Wernicke's encephalopathy      Plan:  Recommend complete abstinence from alcohol and possible inpatient rehab  CIWA protocol  Seizure precautions  Daily thiamine and folate  Daily multivitamin  Discontinue Bupropion (prescribed but pt states she was not taking it at home) due to decreased seizure threshold  Trend LFTs, platelets  Consulted psychiatry; appreciate recommendations

## 2022-11-23 NOTE — ASSESSMENT & PLAN NOTE
Per family patient has been a heavy drinker for past 7-8 years since the passing of her   She drinks upwards of 1 bottle of wine daily  Last drink prior to admission was in the morning when she had one glass of wine  She denies any recent weaning of her alcohol use  She denies history of withdrawal symptoms  She is now presenting with acute anxiety, tactile hallucinations, and palpitations  Also demonstrating signs of Wernicke's encephalopathy  Plan:  Recommend complete abstinence from alcohol and possible inpatient rehab  Pt currently in contemplative stage  Continue discussions with pt regarding importance of quitting alcohol due to the multi-system damage it has been causing  Strongly advise removing all alcohol and benzodiazepines from home     CIWA protocol  Seizure precautions  Daily thiamine and folate  Daily multivitamin  Trend LFTs, platelets  Consulted psychiatry; appreciate recommendations

## 2022-11-23 NOTE — CONSULTS
Psychiatry Consultation Note   Maci Deras 41 y o  female MRN: [id number]  Unit/Bed#: S -01 Encounter: [id number]      Assessment and Plan     Assessment       Assessment:    Lauren Moore is a 41year old overtly appearing  female with past medical history of alcohol use disorder, chronic anemia with previous need for transfusion, status post gastric bypass in , no hx of inpatient or outpatient psychiatric treatment, presenting to ED with brain "fog" x2 days, anxiety, some withdrawal symptoms with last drink that evening of arrival  Psychiatry consulted for alcohol use disorder and disposition planning  On exam, patient is reluctant to discuss, but eventually reveals she is in in contemplative stage of change with respect to alcohol  Patient has utilized alcohol as maladaptive coping for insomnia since    Maladaption likely present for several years before, as patient  heart condition presented in , she had gastric bypass in  and her relationship to food never quite recovered, patient father had stroke 3 years ago that left him dependent completely on his wife for ADLs and wife will not admit him to nursing facility  Lastly, two years ago, patient child came out as transgender and patient has struggled to understand this process, and identifies as "[place]" between grandparents who are not supportive  Additionally with political landscape in [place], patient is concerned for safety of her child, and has over the last several years become socially withdrawn  Patient is denying sadness, excessive guilt, endorsing decreased energy and concentration x2 days, displaying symptoms of anhedonia, does not display psychomotor slowing, endorses difficulty sleeping for approx 5 years, and difficulty with relationship to food post gastric bypass  Denies SI  Patient currently does not meet criteria for inpatient psychiatric admission   Would not consider SSRI or similar at this time as patient is not endorsing strong underlying depressive or anxious symptoms nor is she displaying similar signs, and additional caution is warranted due to active using alcohol  Patient through discussion is moving towards preparation stage of change, recognizing that her drinking is causing health problems  Patient declines therapy and rehab referrals but requests resources for Inpatient, Partial, and IOP for alcohol rehab, and may benefit from dual diagnosis for concurrent anxiety symptoms as patient states this is what drives her insomnia at night  Patient would likely benefit from [facility] resources for parents including PFLAG, [address] [employer] online resources  Principal Psychiatric Problem:  1  Unspecified anxiety disorder rule out substance induced ([address] )  a  Differential: Adjustment disorder with maladaptive coping skills, alcohol dependence disorder    Principal Problem:    Alcohol dependence with withdrawal (HCC)  Active Problems:    Insomnia    Acute metabolic encephalopathy    Transaminitis    Wernicke's encephalopathy    Macrocytic anemia    Thrombocytopenia (HCC)      Plan     Plan:   Discussed with primary team, with the following recommendations:    1  Treatment Recommendations:  a  No indication for inpatient psychiatry at this time  b  Medical management per primary team, no new labs indicated at this time  c  Continue CIWA  d  Provide both AA, Rehab options, LGBTQ-competent therapeutic services ([facility], facebook support groups for parents of transgender children, [place] area resources that may provide virtual options)  2  Pharmacological:   a  Recommend the following medication changes:   i  Primary team may consider Gabapentin 100mg HS for anxiety, withdrawal symptoms, and insomnia, particularly on DC   ii  500mg thiamine IV TID for prevention of Wernicke Encephalopathy  3  Observation level: Routine  4   Referrals: none but patient would benefit from resources as outlined in assessment  5  Psychiatry will sign off at this time  Please reach out to our service via TigerText for re-evaluation as needed  If contacting after hours, please call or TigerText the on-call team (SANTOS: [phone]) with any questions or concerns  Risks, benefits and possible side effects of Medications: No new medications at this time  HPI   History of Present Illness   Physician Requesting Consult: Martin Gonzalez,   Reason for Consult / Principal Problem: "alcohol dependence"    Chief Complaint: "I drink to be able to sleep"    Lesley Proctor is a 41 y o  female, , living and caring for one child, with past psychiatric history of no significance, and past medical history of anemia requiring blood transfusions in previous, s/p gastric bypass who was admitted to [facility] on 2022 due to [address]  Psychiatric consultation was requested for management of alcohol use disorder  On initial psychiatric evaluation, Danie Domingo is somewhat anxious regarding questioning, asking "why are so many doctors coming in here? What's going on?" patient demonstrates good insight stating that she believes primary team ordered consult due to "they think I should be turning to therapy and not the bottle"  Patient initially is shy about answering questions around alcohol use, but opens up in later discussion stating she has had longstanding history of this, drinking 4-5 glasses per night for years, especially after   5 years ago due to heart transplant infection, after dealing with heart condition for over 10 years, leaving her and her child alone  Patient states her father had a stroke 3 years ago and he is now completely dependent on his wife  Patient child is FtM and she worries for his safety, and is struggling to find resources to help navigate being supportive   Patient denies sadness, excessive guilt, endorses difficulty sleeping for 5 years, has a difficult relationship with food and her appetite as a direct result of her gastric bypass in , endorses decreased energy and concentration x2 days, anhedonia in that she has been socially withdrawn due to the above circumstances and being the "fixer" in the family  Patient has trouble sleeping due to worrying about family members at night  She has used alcohol to cope with this for years, increased her drinking since her         Patient denies past psych history, has trialed bupropion with no success, and denies history of SA, as well as current SI, HI, AVH    Psychiatric ROS and PMHx     Psychiatric Review Of Systems:  Sleep: No, as long as she is drinking  Interest/Anhedonia: social withdrawal  Guilt/hopeless: Denies  Low energy/anergy: yes times two days  Poor Concentration: yes times two days  Appetite changes: Yes, endorses difficult relationship to food s/p gastric bypass  Weight changes: Denies  Somatic symptoms: no  Anxiety/panic: Yes, increased  Ariadna: denies previous flight of ideas, risky behavior, not needing sleep for more than 5 days, increased goal oriented activity in discreet episodes   Self injurious behavior/risky behavior: no  Trauma: Loss of  after heart transplant, gastric bypass    Suicidal ideation: no  Homicidal ideation: no  Auditory hallucinations: no  Visual hallucinations: no  Other hallucinations: no  Delusional thinking: no    Eating disorder history: no  Obsessive/compulsive symptoms: no    Historical Information     Past Psychiatric History:   Past Inpatient Psychiatric Treatment:   No history of past inpatient psychiatric admissions  Past Outpatient Psychiatric Treatment:    No history of past outpatient psychiatric treatment  Past Suicide Attempts: no  Past Violent Behavior: no  Past Psychiatric Medication Trials: buproprion    Substance Abuse History:  Social History     Tobacco History     Smoking Status  Some Days Smoking Tobacco Type  Cigarettes    Smokeless Tobacco Use  Never    Tobacco Comments  4 cigaretts a day          Alcohol History     Alcohol Use Status  Yes Drinks/Week  5 Glasses of wine per week Amount  5 0 standard drinks of alcohol/wk          Drug Use     Drug Use Status  No          Sexual Activity     Sexually Active  Not Asked          Activities of Daily Living    Not Asked               Additional Substance Use Detail     Questions Responses    Opiate frequency Daily    Opiate method Pill    Not reviewed  I have assessed this patient for substance use within the past 12 months    Alcohol use: Endorses current use of 4-5 glasses of wine nightly  Longest clean time: unknown by patient  History of Inpatient/Outpatient rehabilitation program: no  Nicotine use: denies use    Family Psychiatric History:   denies    Social History:  Marital History:   Children: 1 child  Living Arrangement: lives in home with child  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  Legal History: none   History: None  Access to Firearms: no    Traumatic History:   loss and family hardship     Past Medical History:  History of Seizures: no  History of Head injury with loss of consciousness: no    Past Medical History:   Diagnosis Date   • Anxiety    • Depression    • Gastric ulcer      Past Surgical History:   Procedure Laterality Date   •  SECTION     • CHOLECYSTECTOMY     • GASTRIC BYPASS         Mental Status Evaluation and Medical ROS     Medical Review Of Systems:  Pertinent items are noted in HPI  Meds/Allergies   All current active medications have been reviewed    Current medications:   Current Facility-Administered Medications   Medication Dose Route Frequency   • chlordiazePOXIDE (LIBRIUM) capsule 10 mg  10 mg Oral Q8H [facility]   • [START ON 2022] cyanocobalamin (VITAMIN B-12) tablet 100 mcg  100 mcg Oral Daily   • folic acid (FOLVITE) tablet 1 mg  1 mg Oral Daily   • lidocaine (LIDODERM) 5 % patch 1 patch  1 patch Topical Daily   • multivitamin-minerals (CENTRUM) tablet 1 tablet  1 tablet Oral Daily   • pantoprazole (PROTONIX) EC tablet 40 mg  40 mg Oral Early Morning   • [START ON 11/26/2022] thiamine (VITAMIN B1) 250 mg in sodium chloride 0 9 % 50 mL IVPB  250 mg Intravenous Daily   • thiamine (VITAMIN B1) 500 mg in sodium chloride 0 9 % 50 mL IVPB  500 mg Intravenous TID     Allergies   Allergen Reactions   • Motrin [Ibuprofen]        Objective   Vital signs in last 24 hours:  Temp:  [99 °F (37 2 °C)-99 4 °F (37 4 °C)] 99 °F (37 2 °C)  HR:  [116-138] 116  Resp:  [16-18] 16  BP: (109-124)/(63-90) 122/81      Intake/Output Summary (Last 24 hours) at 11/23/2022 1507  Last data filed at 11/22/2022 1618  Gross per 24 hour   Intake 1000 ml   Output --   Net 1000 ml       Mental Status Evaluation:  Appearance:  marginal hygiene, dressed in hospital attire, looks stated age, overtly appearing  female, in no apparent acute distress   Behavior:  pleasant, cooperative, guarded at times   Speech:  normal rate and volume, coherent, some intermittent articulation error   Mood:  "ok"   Affect:  constricted, constricted   Language:  WNL   Thought Process:  organized, logical   Associations: intact associations   Thought Content:  no overt delusions   Perceptual Disturbances: none, does not appear to be responding to internal stimuli   Risk Potential: Suicidal ideation - None at present  Homicidal ideation - None at present  Potential for aggression - Not at present   Sensorium:  oriented to person, place and time/date   Memory:  recent and remote memory grossly intact   Consciousness:  alert and awake   Attention/Concentration: attention span and concentration are age appropriate   Intellect: within normal limits   Fund of Knowledge: awareness of current events: yes   Insight:  fair   Judgment: fair   Muscle Strength Muscle Tone: normal  normal   Gait/Station: in bed   Motor Activity: Some mild intermittent tremors     Laboratory Results: I have personally reviewed all pertinent laboratory/tests results    Results from the past 24 hours:   Recent Results (from the past 24 hour(s))   Protime-INR    Collection Time: 11/22/22  8:10 PM   Result Value Ref Range    Protime 13 5 11 6 - 14 5 seconds    INR 1 01 0 84 - 1 19   Magnesium    Collection Time: 11/23/22  5:13 AM   Result Value Ref Range    Magnesium 1 8 (L) 1 9 - 2 7 mg/dL   Comprehensive metabolic panel    Collection Time: 11/23/22  5:13 AM   Result Value Ref Range    Sodium 135 135 - 147 mmol/L    Potassium 3 0 (L) 3 5 - 5 3 mmol/L    Chloride 99 96 - 108 mmol/L    CO2 31 21 - 32 mmol/L    ANION GAP 5 4 - 13 mmol/L    BUN 2 (L) 5 - 25 mg/dL    Creatinine 0 42 (L) 0 60 - 1 30 mg/dL    Glucose 124 65 - 140 mg/dL    Calcium 7 6 (L) 8 4 - 10 2 mg/dL    Corrected Calcium 8 8 8 3 - 10 1 mg/dL     (H) 13 - 39 U/L    ALT 29 7 - 52 U/L    Alkaline Phosphatase 389 (H) 34 - 104 U/L    Total Protein 5 1 (L) 6 4 - 8 4 g/dL    Albumin 2 5 (L) 3 5 - 5 0 g/dL    Total Bilirubin 2 15 (H) 0 20 - 1 00 mg/dL    eGFR 124 ml/min/1 73sq m   CBC and Platelet    Collection Time: 11/23/22  5:13 AM   Result Value Ref Range    WBC 3 22 (L) 4 31 - 10 16 Thousand/uL    RBC 2 39 (L) 3 81 - 5 12 Million/uL    Hemoglobin 9 4 (L) 11 5 - 15 4 g/dL    Hematocrit 28 4 (L) 34 8 - 46 1 %     (H) 82 - 98 fL    MCH 39 3 (H) 26 8 - 34 3 pg    MCHC 33 1 31 4 - 37 4 g/dL    RDW 14 6 11 6 - 15 1 %    Platelets 83 (L) 190 - 390 Thousands/uL    MPV 11 0 8 9 - 12 7 fL     Most Recent Labs:   Lab Results   Component Value Date    WBC 3 22 (L) 11/23/2022    RBC 2 39 (L) 11/23/2022    HGB 9 4 (L) 11/23/2022    HCT 28 4 (L) 11/23/2022    PLT 83 (L) 11/23/2022    RDW 14 6 11/23/2022    NEUTROABS 3 60 11/22/2022    SODIUM 135 11/23/2022    K 3 0 (L) 11/23/2022    CL 99 11/23/2022    CO2 31 11/23/2022    BUN 2 (L) 11/23/2022 CREATININE 0 42 (L) 11/23/2022    GLUC 124 11/23/2022    CALCIUM 7 6 (L) 11/23/2022     (H) 11/23/2022    ALT 29 11/23/2022    ALKPHOS 389 (H) 11/23/2022    TP 5 1 (L) 11/23/2022    ALB 2 5 (L) 11/23/2022    TBILI 2 15 (H) 11/23/2022    JEY2AHCJBYVD 5 798 (H) 11/22/2022    FREET4 0 88 11/22/2022    PREGUR negative 04/11/2019       Imaging Studies: XR chest 1 view portable    Result Date: 11/23/2022  Narrative: CHEST INDICATION:   weakness  COMPARISON:  Chest radiograph October 29, 2009 EXAM PERFORMED/VIEWS:  XR CHEST PORTABLE FINDINGS: Cardiomediastinal silhouette appears unremarkable  The lungs are clear  No pneumothorax or pleural effusion  Osseous structures appear within normal limits for patient age  Impression: No acute cardiopulmonary disease  Workstation performed: [id number]     CT head without contrast    Result Date: 11/22/2022  Narrative: CT BRAIN - WITHOUT CONTRAST INDICATION:   Headache, new or worsening, neuro deficit (Age 25-51y) confusion  COMPARISON:  None  TECHNIQUE:  CT examination of the brain was performed  In addition to axial images, sagittal and coronal 2D reformatted images were created and submitted for interpretation  Radiation dose length product (DLP) for this visit:  1003 mGy-cm   This examination, like all CT scans performed in the [facility], was performed utilizing techniques to minimize radiation dose exposure, including the use of iterative reconstruction and automated exposure control  IMAGE QUALITY:  Diagnostic  FINDINGS: PARENCHYMA:  No intracranial mass, mass effect or midline shift  No CT signs of acute infarction  No acute parenchymal hemorrhage  VENTRICLES AND EXTRA-AXIAL SPACES:  Normal for the patient's age  VISUALIZED ORBITS AND PARANASAL SINUSES:  No acute abnormality involving the orbits  Mild scattered sinus mucosal thickening is noted  No fluid levels are seen   CALVARIUM AND EXTRACRANIAL SOFT TISSUES:  Normal      Impression: No acute intracranial abnormality  Workstation performed: [id number]     US right upper quadrant    Result Date: 11/22/2022  Narrative: RIGHT UPPER QUADRANT ULTRASOUND INDICATION:     Elevated alkaline phosphatase and liver function tests  COMPARISON:  Limited abdominal ultrasound 7/14/2021  TECHNIQUE:   Real-time ultrasound of the right upper quadrant was performed with a curvilinear transducer with both volumetric sweeps and still imaging techniques  FINDINGS: PANCREAS:  Visualized portions of the pancreas are within normal limits  AORTA AND IVC:  Visualized portions are normal for patient age  LIVER: Size:  Severely enlarged  The liver measures 25 3 cm in the midclavicular line  Contour:  Surface contour is mildly nodular  Parenchyma: There is moderate diffuse increased echogenicity with smooth echotexture and acoustic beam attenuation  Most consistent with moderate hepatic steatosis  No liver mass identified  Limited imaging of the main portal vein shows it to be patent and hepatopetal   BILIARY: Patient has undergone cholecystectomy  No intrahepatic biliary dilatation  CBD measures 7 0 mm  No choledocholithiasis  KIDNEY: Right kidney measures 10 6 x 3 3 x 6 1 cm  Volume 109 5 mL Kidney within normal limits  ASCITES:   None  Impression: 1  Severe hepatomegaly with moderate hepatic steatosis and a mildly nodular liver surface contour, suggesting early cirrhotic changes  2   Status post cholecystectomy   Workstation performed: [id number]     EKG: NGh=972 on 11/22    Code Status: Prior  Advance Directive and Living Will:       Power of :      Suicide/Homicide Risk Assessment:  Risk of Harm to Self:  • The following ratings are based on assessment at the time of the interview and review of records  • Demographic risk factors include: ,  status  • Historical Risk Factors include: alcohol use, history of traumatic experiences, maladaptive coping skills  • Recent Specific Risk Factors include: current anxiety symptoms, alcohol dependence and increasing awareness of such  • Protective Factors: no current suicidal ideation, being a parent, good self-esteem, having a desire to be alive, desire for treatment and sobriety  • Weapons: none  The following steps have been taken to ensure weapons are properly secured: not applicable  • Based on today's assessment, Benja Lima presents the following risk of harm to self: minimal    Risk of Harm to Others:  • The following ratings are based on assessment at the time of the interview and review of records  • Demographic Risk Factors include: none  • Historical Risk Factors include: none  • Recent Specific Risk Factors include: none  • Protective Factors: no current homicidal ideation  • Weapons: none  The following steps have been taken to ensure weapons are properly secured: not applicable  • Based on today's assessment, Benja Lima presents the following risk of harm to others: minimal    The following interventions are recommended: no intervention changes needed      Librado High DO 11/23/22  Psychiatry Resident, PGY-II    This note was completed in part utilizing modul[employer] Direct Software  Grammatical, translation, syntax errors, random word insertions, spelling mistakes, and incomplete sentences may be an occasional consequence of this system secondary to software limitations with voice recognition, ambient noise, and hardware issues  If you have any questions or concerns about the content, text, or information contained within the body of this dictation, please contact the provider for clarification

## 2022-11-23 NOTE — ASSESSMENT & PLAN NOTE
Recent Labs     11/22/22  1354 11/23/22  0513   * 169*   ALT 36 29   ALKPHOS 517* 389*   TBILI 1 91* 2 15*   BILIDIR 0 84*  --      Likely alcohol induced liver injury rather than NAFLD    Plan:  See plans for Wernicke's encephalopathy, Alcohol dependence  Trend LFTs

## 2022-11-23 NOTE — ASSESSMENT & PLAN NOTE
Recent Labs     11/22/22  1354   HGB 11 2*       Pt has history of iron deficiency anemia due to poor oral intake of iron  She has history of B12 deficiency  CBC in early 2019 showing microcytic anemia with MCV 78  However this admission she is showing macrocytosis w/  Likely combination of heavy alcohol consumption with possible concurrent B12 and/or folate deficiency    Plan:  FU B12 and folate  Replete B12 if < 250  Daily 1 mg folate   Trend HgB   Transfuse if HgB < 7

## 2022-11-23 NOTE — PROGRESS NOTES
[facility]  Progress Note Theresa Deras 1975, 41 y o  female MRN: [id number]  Unit/Bed#: S -01 Encounter: [id number]  Primary Care Provider: Ofelia Lemus DO   Date and time admitted to hospital: 11/22/2022  1:05 PM    * Alcohol dependence with withdrawal [facility])  Assessment & Plan  Per family patient has been a heavy drinker for past 7-8 years since the passing of her   She drinks upwards of 1 bottle of wine daily  Last drink prior to admission was in the morning when she had one glass of wine  She denies any recent weaning of her alcohol use  She denies history of withdrawal symptoms  She is now presenting with acute anxiety, tactile hallucinations, and palpitations  Also demonstrating signs of Wernicke's encephalopathy  Plan:  Recommend complete abstinence from alcohol and possible inpatient rehab  Pt currently in contemplative stage  Continue discussions with pt regarding importance of quitting alcohol due to the multi-system damage it has been causing  Strongly advise removing all alcohol and benzodiazepines from home  CIWA protocol  Seizure precautions  Daily thiamine and folate  Daily multivitamin  Trend LFTs, platelets  Consulted psychiatry; appreciate recommendations    Wernicke's encephalopathy  Assessment & Plan  Pt presenting with nystagmus, ataxia and brain fog  Also showing cerebellar dysfunction on physical exam  Associated with heavy chronic alcohol use  Plan: Thiamine 500 mg IV TID x 2 days  Then 250 mg IV/IM qd x 5 days    CIWA protocol  Fall precautions        Transaminitis  Assessment & Plan  Recent Labs     11/22/22  1354 11/23/22  0513   * 169*   ALT 36 29   ALKPHOS 517* 389*   TBILI 1 91* 2 15*   BILIDIR 0 84*  --      Likely alcohol induced liver injury rather than NAFLD    Plan:  See plans for Wernicke's encephalopathy, Alcohol dependence  Trend LFTs    Macrocytic anemia  Assessment & Plan  Recent Labs 11/22/22  1354 11/23/22  0513   HGB 11 2* 9 4*     Lab Results   Component Value Date    [id number] 229 11/22/2022       Pt has history of iron deficiency anemia due to poor oral intake of iron  She has history of B12 deficiency  CBC in early 2019 showing microcytic anemia with MCV 78  However this admission she is showing macrocytosis w/  Likely combination of heavy alcohol consumption with possible concurrent B12 and/or folate deficiency    Plan:  Replete B12 if < 250  Daily 1 mg folate   Trend HgB  Transfuse if HgB < 7    Thrombocytopenia (HCC)  Assessment & Plan  Recent Labs     11/22/22  1354 11/23/22  0513   PLT 98* 83*     Likely 2/2 bone marrow suppression in the setting of chronic alcohol dependence  Trend CBC    Insomnia  Assessment & Plan  Likely 2/2 heavy alcohol use  Pt has history of self medicating with Xanax or, more recently, alcohol  Plan:  Melatonin qhs prn  Librium q8H        VTE Pharmacologic Prophylaxis:   VTE Score: 2 Low Risk (Score 0-2) - Encourage Ambulation  Mechanical VTE Prophylaxis in Place: No    Patient Centered Rounds: I have performed bedside rounds with nursing staff today  Discussions with Specialists or Other Care Team Provider: Psychiatry      Current Length of Stay: 1 day(s)    Current Patient Status: Inpatient     Discharge Plan / Estimated Discharge Date: Anticipate discharge in > 72 hrs to discharge location to be determined pending rehab evaluations  Code Status: Prior      Subjective:   Pt reports improvement in brain fog this morning  She is still having trouble sleeping but states the ativan has helped somewhat  We had a lengthy discussion regarding the need for her complete abstinence from alcohol and replacement with healthier coping strategies  Pt states that she uses alcohol primarily for stress relief and insomnia   I explained that alcohol use will only worsen her insomnia and give her more stress and anxiety, especially when she begins experiencing withdrawal symptoms  She expressed understanding however struggled to grasp the severity of her illness  She also experienced some confusion during our conversation and required gentle direction  She is unwilling to initiate inpatient rehab upon discharge at the moment  We can revisit this at a later time  Objective:     Vitals:   Temp (24hrs), Av 2 °F (37 3 °C), Min:99 °F (37 2 °C), Max:99 4 °F (37 4 °C)    Temp:  [99 °F (37 2 °C)-99 4 °F (37 4 °C)] 99 °F (37 2 °C)  HR:  [117-138] 117  Resp:  [17-18] 17  BP: (109-124)/(63-90) 113/70  SpO2:  [94 %-96 %] 94 %  Body mass index is 30 96 kg/m²  Input and Output Summary (last 24 hours): Intake/Output Summary (Last 24 hours) at 2022 1352  Last data filed at 2022 1618  Gross per 24 hour   Intake 1000 ml   Output --   Net 1000 ml       Physical Exam:     Physical Exam  Vitals and nursing note reviewed  Constitutional:       General: She is not in acute distress  Appearance: Normal appearance  She is obese  She is diaphoretic  She is not ill-appearing  HENT:      Head: Normocephalic and atraumatic  Right Ear: External ear normal       Left Ear: External ear normal       Mouth/Throat:      Mouth: Mucous membranes are moist       Pharynx: Oropharynx is clear  Eyes:      Extraocular Movements: Extraocular movements intact  Right eye: Nystagmus present  Normal extraocular motion  Left eye: Nystagmus present  Normal extraocular motion  Conjunctiva/sclera: Conjunctivae normal       Pupils: Pupils are equal, round, and reactive to light  Cardiovascular:      Rate and Rhythm: Regular rhythm  Tachycardia present  Pulses: Normal pulses  Heart sounds: Normal heart sounds  Pulmonary:      Effort: Pulmonary effort is normal  No respiratory distress  Breath sounds: Normal breath sounds  Abdominal:      General: Abdomen is flat  Bowel sounds are normal       Palpations: Abdomen is soft  Musculoskeletal:         General: No swelling, tenderness or deformity  Right lower leg: Edema present  Left lower leg: Edema present  Skin:     General: Skin is warm  Capillary Refill: Capillary refill takes less than 2 seconds  Neurological:      Mental Status: She is alert and oriented to person, place, and time  Cranial Nerves: Cranial nerves 2-12 are intact  No dysarthria  Motor: Tremor present  No seizure activity or pronator drift  Coordination: Finger-Nose-Finger Test abnormal       Gait: Gait abnormal       Comments: Slow, wide based gait   Psychiatric:         Mood and Affect: Mood normal          Behavior: Behavior normal          Cognition and Memory: Cognition is impaired  Memory is impaired            Additional Data:     Labs:  Results from last 7 days   Lab Units 11/23/22  0513 11/22/22  1354   WBC Thousand/uL 3 22* 4 48   HEMOGLOBIN g/dL 9 4* 11 2*   HEMATOCRIT % 28 4* 32 9*   PLATELETS Thousands/uL 83* 98*   NEUTROS PCT %  --  80*   LYMPHS PCT %  --  14   MONOS PCT %  --  4   EOS PCT %  --  0     Results from last 7 days   Lab Units 11/23/22  0513   SODIUM mmol/L 135   POTASSIUM mmol/L 3 0*   CHLORIDE mmol/L 99   CO2 mmol/L 31   BUN mg/dL 2*   CREATININE mg/dL 0 42*   ANION GAP mmol/L 5   CALCIUM mg/dL 7 6*   ALBUMIN g/dL 2 5*   TOTAL BILIRUBIN mg/dL 2 15*   ALK PHOS U/L 389*   ALT U/L 29   AST U/L 169*   GLUCOSE RANDOM mg/dL 124     Results from last 7 days   Lab Units 11/22/22  2010   INR  1 01     Results from last 7 days   Lab Units 11/22/22  1354   POC GLUCOSE mg/dl 164*               Imaging: Reviewed radiology reports from this admission including: CT head    Recent Cultures (last 7 days):           Lines/Drains:  Invasive Devices     Peripheral Intravenous Line  Duration           Peripheral IV 11/22/22 Left Antecubital 1 day                Telemetry:   Telemetry Orders (From admission, onward)             48 Hour Telemetry Monitoring  Continuous x 48 hours        References:    Telemetry Guidelines   Question:  Reason for 48 Hour Telemetry  Answer:  Arrhythmias Requiring Medical Therapy (eg  SVT, Vtach/fib, Bradycardia, Uncontrolled A-fib)                    Last 24 Hours Medication List:   Current Facility-Administered Medications   Medication Dose Route Frequency Provider Last Rate   • chlordiazePOXIDE  10 mg Oral Q8H Elaine Ca DO     • folic acid  1 mg Oral Daily Bk Eastman MD     • multivitamin-minerals  1 tablet Oral Daily Bk Eastman MD     • pantoprazole  40 mg Oral Early Morning Bk Eastman MD     • [START ON 11/26/2022] thiamine  250 mg Intravenous Daily Bk Eastman MD     • thiamine  500 mg Intravenous TID Bk Eastman MD          Today, Patient Was Seen By: Bk Eastman MD    ** Please Note: This note has been constructed using a voice recognition system   **

## 2022-11-23 NOTE — ASSESSMENT & PLAN NOTE
Recent Labs     11/22/22  1354 11/23/22  0513   PLT 98* 83*     Likely 2/2 bone marrow suppression in the setting of chronic alcohol dependence  Trend CBC

## 2022-11-23 NOTE — CASE MANAGEMENT
Case Management Progress Note    Patient name Julito Ortiz  Location S /S -01 MRN [id number]  : 1975 Date 2022       LOS (days): 1  Geometric Mean LOS (GMLOS) (days):   Days to GMLOS:        OBJECTIVE:        Current admission status: Inpatient  Preferred Pharmacy:   [address] [place] [address], [address] [place] [address] Road  [address] [facility] [address] [facility] [address]  Phone: [phone] Fax: [phone]    [address]  [address]  [facility] [address]  Phone: [phone] Fax: [phone]    [address], [address] [facility]  [address] [facility] [address]  Phone: [phone] Fax: [phone]    Primary Care Provider: Dewey Lemus DO    Primary Insurance: [place] [employer]  Secondary Insurance:     PROGRESS NOTE:    Patient admitted due to alcohol dependence; started on IV thiamine  RN relaying patient requesting information on ETOH rehab/out-patient programs, so referral made to [facility]  Spoke with Sharon Love at [facility] who relays patient had requested they come back another time; reports she will follow-up on Friday unless support needed tomorrow, then on-call can be contacted ([phone])

## 2022-11-23 NOTE — ASSESSMENT & PLAN NOTE
Recent Labs     11/22/22  1354   PLT 98*     Likely 2/2 bone marrow suppression in the setting of chronic alcohol dependence  Trend CBC

## 2022-11-23 NOTE — ASSESSMENT & PLAN NOTE
Pt presenting with nystagmus, ataxia and brain fog  Also showing cerebellar dysfunction on physical exam  Associated with heavy chronic alcohol use  Plan:   Thiamine 200 mg TID  CIWA protocol  Fall precautions

## 2022-11-23 NOTE — PLAN OF CARE
Problem: MOBILITY - ADULT  Goal: Maintain or return to baseline ADL function  Description: INTERVENTIONS:  -  Assess patient's ability to carry out ADLs; assess patient's baseline for ADL function and identify physical deficits which impact ability to perform ADLs (bathing, care of mouth/teeth, toileting, grooming, dressing, etc )  - Assess/evaluate cause of self-care deficits   - Assess range of motion  - Assess patient's mobility; develop plan if impaired  - Assess patient's need for assistive devices and provide as appropriate  - Encourage maximum independence but intervene and supervise when necessary  - Involve family in performance of ADLs  - Assess for home care needs following discharge   - Consider OT consult to assist with ADL evaluation and planning for discharge  - Provide patient education as appropriate  Outcome: Progressing  Goal: Maintains/Returns to pre admission functional level  Description: INTERVENTIONS:  - Perform BMAT or MOVE assessment daily    - Set and communicate daily mobility goal to care team and patient/family/caregiver  - Collaborate with rehabilitation services on mobility goals if consulted  - Perform Range of Motion  times a day  - Reposition patient every hours    - Dangle patient  times a day  - Stand patient times a day  - Ambulate patient  times a day  - Out of bed to chair  times a day   - Out of bed for meals times a day  - Out of bed for toileting  - Record patient progress and toleration of activity level   Outcome: Progressing     Problem: Potential for Falls  Goal: Patient will remain free of falls  Description: INTERVENTIONS:  - Educate patient/family on patient safety including physical limitations  - Instruct patient to call for assistance with activity   - Consult OT/PT to assist with strengthening/mobility   - Keep Call bell within reach  - Keep bed low and locked with side rails adjusted as appropriate  - Keep care items and personal belongings within reach  - Initiate and maintain comfort rounds  - Make Fall Risk Sign visible to staff  - Offer Toileting every  Hours, in advance of need  - Initiate/Maintain alarm  - Obtain necessary fall risk management equipment:   - Apply yellow socks and bracelet for high fall risk patients  - Consider moving patient to room near nurses station  Outcome: Progressing

## 2022-11-23 NOTE — UTILIZATION REVIEW
Initial Clinical Review    Admission: Date/Time/Statement:   Admission Orders (From admission, onward)     Ordered        11/22/22 1651  Inpatient Admission  Once                      Orders Placed This Encounter   Procedures   • Inpatient Admission     Standing Status:   Standing     Number of Occurrences:   1     Order Specific Question:   Level of Care     Answer:   Med Surg [16]     Order Specific Question:   Estimated length of stay     Answer:   More than 2 Midnights     Order Specific Question:   Certification     Answer:   I certify that inpatient services are medically necessary for this patient for a duration of greater than two midnights  See H&P and MD Progress Notes for additional information about the patient's course of treatment  ED Arrival Information     Expected   -    Arrival   11/22/2022 13:01    Acuity   Emergent            Means of arrival   Walk-In    Escorted by   Self    Service   Hospitalist    Admission type   Emergency            Arrival complaint   Elevated HR/HTN/Dizziness           Chief Complaint   Patient presents with   • Weakness - Generalized     Pt states she is weak lately and shaky, loss of appetite, brain fog, trouble sleeping, anxious, and thirsty no matter how much she drinks  Initial Presentation: 41 y o  female w/ PMHx alcohol use disorder who presents with anxiety, nervousness, brain fog x 2 days  She came to the ED because she "did not feel safe to drive " She drinks a bottle of wine per day  Last drink was this morning  She is a daily drinker for the past 7-8 years since the passing of her   She denies known history of withdrawal symptoms  She admits to chronic anxiety as well as insomnia but does not attribute this to her alcohol use   Plan: Inpatient admission for evaluation and treatment of alcohol dependence with withdrawal, Wernicke's encephalopathy: CIWA protocol, seizure precautions, daily thiamine, folate and MVI, trend LFTs and platelets, consult Psych  Date: 11/23   Day 2:     Internal medicine: Recommend complete abstinence from alcohol and possible inpatient rehab  Pt currently in contemplative stage  Continue discussions with pt regarding importance of quitting alcohol due to the multi-system damage it has been causing  Strongly advise removing all alcohol and benzodiazepines from home  Continue CIWA protocol, seixure precautions, daily thiamine, folate and MVI, trend LFTs, consult Psych, Thiamine 500 mg IV TID x 2 days  Then 250 mg IV/IM qd x 5 days      ED Triage Vitals   Temperature Pulse Respirations Blood Pressure SpO2   11/22/22 1310 11/22/22 1310 11/22/22 1310 11/22/22 1310 11/22/22 1310   98 7 °F (37 1 °C) (!) 136 20 147/86 97 %      Temp Source Heart Rate Source Patient Position - Orthostatic VS BP Location FiO2 (%)   11/22/22 1310 11/22/22 1615 11/22/22 1310 11/22/22 1310 --   Oral Monitor Lying Right arm       Pain Score       11/22/22 1938       No Pain          Wt Readings from Last 1 Encounters:   11/22/22 87 kg (191 lb 12 8 oz)     Additional Vital Signs:     Date/Time Temp Pulse Resp BP MAP (mmHg) SpO2 O2 Device   11/23/22 1052 -- 117 Abnormal  -- -- -- -- --   11/23/22 07:16:35 99 °F (37 2 °C) 124 Abnormal  17 113/70 84 94 % --   11/23/22 0600 -- 119 Abnormal  -- -- -- -- --   11/23/22 0200 -- 118 Abnormal  -- -- -- -- --   11/22/22 2149 -- 126 Abnormal  -- -- -- -- --   11/22/22 21:12:45 -- 127 Abnormal  -- 109/65 80 95 % --   11/22/22 18:00:49 99 4 °F (37 4 °C) 132 Abnormal  18 124/90 101 96 % None (Room air)   11/22/22 1716 -- 138 Abnormal  18 121/63 -- 95 % None (Room air)   11/22/22 1654 -- 134 Abnormal  -- 120/64 -- -- --   11/22/22 1615 -- 132 Abnormal  18 122/66 88 96 % None (Room air)   11/22/22 1409 -- -- -- -- -- -- None (Room air)   11/22/22 1330 -- 122 Abnormal  18 140/75 100 97 % --     CIWA-Ar   BP -- -- 113/70  -DI -- --   Pulse 117 Abnormal   -JH -- 124 Abnormal   - Abnormal   - Abnormal   -DS Nausea and Vomiting 0  -JH --  -JH -- 0  -DS 0  -DS   Tactile Disturbances 0  -JH --  -JH -- 0  -DS 0  -DS   Tremor 2  -JH --  -JH -- 1  -DS 1  -DS   Auditory Disturbances 0  -JH --  -JH -- 0  -DS 0  -DS   Paroxysmal Sweats 0  -JH --  -JH -- 0  -DS 0  -DS   Visual Disturbances 0  -JH --  -JH -- 0  -DS 0  -DS   Anxiety 2  -JH --  -JH -- 2  -DS 3  -DS   Headache, Fullness in Head 1  -JH --  -JH -- 0  -DS 0  -DS   Agitation 0  -JH --  -JH -- 0  -DS 0  -DS   Orientation and Clouding of Sensorium 0  -JH --  -JH -- 0  -DS 0  -DS   CIWA-Ar Total 5  -JH --  -JH -- 3  -DS 4  -DS   Row Name 11/22/22 2149 11/22/22 21:12:45 11/22/22 2049 11/22/22 18:00:49 11/22/22 1716   CIWA-Ar   BP -- 109/65  -DI -- 124/90  -/63  -KT   Pulse 126 Abnormal   - Abnormal   -DI -- 132 Abnormal   - Abnormal   -KT   Nausea and Vomiting 0  -MK -- 0  -MK -- --   Tactile Disturbances 0  -MK -- 0  -MK -- --   Tremor 1  -MK -- 2  -MK -- --   Auditory Disturbances 0  -MK -- 0  -MK -- --   Paroxysmal Sweats 0  -MK -- 1  -MK -- --   Visual Disturbances 0  -MK -- 0  -MK -- --   Anxiety 4  -MK -- 5  -MK -- --   Headache, Fullness in Head 0  -MK -- 0  -MK -- --   Agitation 0  -MK -- 0  -MK -- --   Orientation and Clouding of Sensorium 0  -MK -- 0  -MK -- --   CIWA-Ar Total 5  -MK -- 8  -MK -- --   Row Name 11/22/22 1654 11/22/22 1615 11/22/22 1330 11/22/22 1310    CIWA-Ar   /64  -/66  -/75  -/86  -LD    Pulse 134 Abnormal   - Abnormal   - Abnormal   - Abnormal   -LD    Nausea and Vomiting 0  -JA -- -- --    Tactile Disturbances 1  -JA -- -- --    Tremor 0  -JA -- -- --    Auditory Disturbances 0  -JA -- -- --    Paroxysmal Sweats 0  -JA -- -- --    Visual Disturbances 0  -JA -- -- --    Anxiety 2  -JA -- -- --    Headache, Fullness in Head 1  -JA -- -- --    Agitation 0  -JA -- -- --    Orientation and Clouding of Sensorium 0  -JA -- -- --    CIWA-Ar Total 4  -JA           Pertinent Labs/Diagnostic Test Results:   US right upper quadrant   Final Result by Charlene Jacobson MD (11/22 1623)      1  Severe hepatomegaly with moderate hepatic steatosis and a mildly nodular liver surface contour, suggesting early cirrhotic changes  2   Status post cholecystectomy  Workstation performed: [id number]         CT head without contrast   Final Result by Nick Aparicio MD (11/22 1453)      No acute intracranial abnormality  Workstation performed: [id number]         XR chest 1 view portable   ED Interpretation by Vannessa Torres DO (11/22 1406)   No acute cardiopulmonary disease  Final Result by Janet Carr MD (41/84 1609)      No acute cardiopulmonary disease                    Workstation performed: [id number]           11/22 EKG:  Sinus tachycardia with occasional Premature ventricular complexes  Otherwise normal ECG  When compared with ECG of 14-OCT-2021 08:41,  Premature ventricular complexes are now Present    Results from last 7 days   Lab Units 11/22/22  1613   SARS-COV-2  Negative     Results from last 7 days   Lab Units 11/23/22  0513 11/22/22  1354   WBC Thousand/uL 3 22* 4 48   HEMOGLOBIN g/dL 9 4* 11 2*   HEMATOCRIT % 28 4* 32 9*   PLATELETS Thousands/uL 83* 98*   NEUTROS ABS Thousands/µL  --  3 60         Results from last 7 days   Lab Units 11/23/22  0513 11/22/22  1354   SODIUM mmol/L 135 135   POTASSIUM mmol/L 3 0* 3 3*   CHLORIDE mmol/L 99 93*   CO2 mmol/L 31 27   ANION GAP mmol/L 5 15*   BUN mg/dL 2* 3*   CREATININE mg/dL 0 42* 0 45*   EGFR ml/min/1 73sq m 124 121   CALCIUM mg/dL 7 6* 8 6   MAGNESIUM mg/dL 1 8*  --      Results from last 7 days   Lab Units 11/23/22  0513 11/22/22  1354   AST U/L 169* 184*   ALT U/L 29 36   ALK PHOS U/L 389* 517*   TOTAL PROTEIN g/dL 5 1* 6 2*   ALBUMIN g/dL 2 5* 3 1*   TOTAL BILIRUBIN mg/dL 2 15* 1 91*   BILIRUBIN DIRECT mg/dL  --  0 84*     Results from last 7 days   Lab Units 11/22/22  1354   POC GLUCOSE mg/dl 164*     Results from last 7 days   Lab Units 11/23/22  0513 11/22/22  1354   GLUCOSE RANDOM mg/dL 124 165*         Results from last 7 days   Lab Units 11/22/22  1816 11/22/22  1354 11/22/22  0000   HS TNI 0HR ng/L  --  9  --    HS TNI 2HR ng/L  --   --  9   HSTNI D2 ng/L  --   --  0   HS TNI 4HR ng/L 9  --   --    HSTNI D4 ng/L 0  --   --          Results from last 7 days   Lab Units 11/22/22 2010   PROTIME seconds 13 5   INR  1 01     Results from last 7 days   Lab Units 11/22/22  0000   TSH 3RD GENERATON uIU/mL 5 798*         Results from last 7 days   Lab Units 11/22/22  1527   CLARITY UA  Clear   COLOR UA  Yellow   SPEC GRAV UA  1 008   PH UA  7 0   GLUCOSE UA mg/dl Negative   KETONES UA mg/dl Negative   BLOOD UA  Negative   PROTEIN UA mg/dl Negative   NITRITE UA  Negative   BILIRUBIN UA  Negative   UROBILINOGEN UA (BE) mg/dl <2 0   LEUKOCYTES UA  Negative     Results from last 7 days   Lab Units 11/22/22  1613   INFLUENZA A PCR  Negative   INFLUENZA B PCR  Negative   RSV PCR  Negative         ED Treatment:   Medication Administration from 11/22/2022 1301 to 11/22/2022 1738       Date/Time Order Dose Route Action     11/22/2022 1407 EST sodium chloride 0 9 % bolus 1,000 mL 1,000 mL Intravenous New Bag     11/22/2022 1407 EST LORazepam (ATIVAN) injection 1 mg 1 mg Intravenous Given     11/22/2022 1556 EST thiamine tablet 100 mg 100 mg Oral Given     11/22/2022 1556 EST potassium chloride (K-DUR,KLOR-CON) CR tablet 20 mEq 20 mEq Oral Given     11/22/2022 1614 EST LORazepam (ATIVAN) injection 1 mg 1 mg Intravenous Given        Past Medical History:   Diagnosis Date   • Anxiety    • Depression    • Gastric ulcer      Present on Admission:  • Insomnia  • Acute metabolic encephalopathy  • Alcohol dependence with withdrawal (HCC)  • Transaminitis  • Wernicke's encephalopathy  • Macrocytic anemia  • Thrombocytopenia (HCC)      Admitting Diagnosis: Platelets decreased ([facility] [address] ) [D69 6]  Tachycardia [R00 0]  Hypertension [I10]  Elevated alkaline phosphatase level [R74 8]  Generalized weakness [R53 1]  Elevated bilirubin [R17]  Age/Sex: 41 y o  female  Admission Orders:  Scheduled Medications:  chlordiazePOXIDE, 10 mg, Oral, V[facility]Z [facility]  folic acid, 1 mg, Oral, Daily  multivitamin-minerals, 1 tablet, Oral, Daily  pantoprazole, 40 mg, Oral, Early Morning  potassium chloride, 40 mEq, Oral, Once  thiamine, 200 mg, Oral, TID      Continuous IV Infusions:     PRN Meds:       IP CONSULT TO PSYCHIATRY    Network Utilization Review Department  ATTENTION: Please call with any questions or concerns to [phone] and carefully listen to the prompts so that you are directed to the right person  All voicemails are confidential   Georgie Eis all requests for admission clinical reviews, approved or denied determinations and any other requests to dedicated fax number below belonging to the campus where the patient is receiving treatment   List of dedicated fax numbers for the Facilities:  [address] DENIALS (Administrative/Medical Necessity) [phone]   [address] (Maternity/NICU/Pediatrics) [phone]   913 Lisa Torres [phone]   [facility] 77 [phone]   [address] [place] [address] [facility] [phone]   [facility] Mandy Myers [facility] 134 [address] [facility] [phone]

## 2022-11-23 NOTE — ASSESSMENT & PLAN NOTE
Likely 2/2 heavy alcohol use  Pt has history of self medicating with Xanax or, more recently, alcohol      Plan:  Melatonin qhs prn  Librium q8H

## 2022-11-23 NOTE — ASSESSMENT & PLAN NOTE
Pt presenting with nystagmus, ataxia and brain fog  Also showing cerebellar dysfunction on physical exam  Associated with heavy chronic alcohol use  Plan: Thiamine 500 mg IV TID x 2 days  Then 250 mg IV/IM qd x 5 days    CIWA protocol  Fall precautions

## 2022-11-23 NOTE — PLAN OF CARE
Problem: MOBILITY - ADULT  Goal: Maintain or return to baseline ADL function  Description: INTERVENTIONS:  -  Assess patient's ability to carry out ADLs; assess patient's baseline for ADL function and identify physical deficits which impact ability to perform ADLs (bathing, care of mouth/teeth, toileting, grooming, dressing, etc )  - Assess/evaluate cause of self-care deficits   - Assess range of motion  - Assess patient's mobility; develop plan if impaired  - Assess patient's need for assistive devices and provide as appropriate  - Encourage maximum independence but intervene and supervise when necessary  - Involve family in performance of ADLs  - Assess for home care needs following discharge   - Consider OT consult to assist with ADL evaluation and planning for discharge  - Provide patient education as appropriate  Outcome: Progressing  Goal: Maintains/Returns to pre admission functional level  Description: INTERVENTIONS:  - Perform BMAT or MOVE assessment daily    - Set and communicate daily mobility goal to care team and patient/family/caregiver  - Collaborate with rehabilitation services on mobility goals if consulted  - Perform Range of Motion 2 times a day  - Reposition patient every 2 hours    - Dangle patient 2 times a day  - Stand patient 2 times a day  - Ambulate patient 2 times a day  - Out of bed to chair 2 times a day   - Out of bed for meals 2 times a day  - Out of bed for toileting  - Record patient progress and toleration of activity level   Outcome: Progressing     Problem: Potential for Falls  Goal: Patient will remain free of falls  Description: INTERVENTIONS:  - Educate patient/family on patient safety including physical limitations  - Instruct patient to call for assistance with activity   - Consult OT/PT to assist with strengthening/mobility   - Keep Call bell within reach  - Keep bed low and locked with side rails adjusted as appropriate  - Keep care items and personal belongings within reach  - Initiate and maintain comfort rounds  - Make Fall Risk Sign visible to staff  - Offer Toileting every 2 Hours, in advance of need  - Initiate/Maintain alarm  - Obtain necessary fall risk management equipment:   - Apply yellow socks and bracelet for high fall risk patients  - Consider moving patient to room near nurses station  Outcome: Progressing

## 2022-11-24 PROBLEM — I47.29 NONSUSTAINED VENTRICULAR TACHYCARDIA: Status: ACTIVE | Noted: 2022-11-24

## 2022-11-24 LAB
ALBUMIN SERPL BCP-MCNC: 2.5 G/DL (ref 3.5–5)
ALP SERPL-CCNC: 381 U/L (ref 34–104)
ALT SERPL W P-5'-P-CCNC: 28 U/L (ref 7–52)
AMMONIA PLAS-SCNC: 83 UMOL/L (ref 18–72)
ANION GAP SERPL CALCULATED.3IONS-SCNC: 6 MMOL/L (ref 4–13)
AST SERPL W P-5'-P-CCNC: 162 U/L (ref 13–39)
BILIRUB SERPL-MCNC: 1.35 MG/DL (ref 0.2–1)
BUN SERPL-MCNC: 3 MG/DL (ref 5–25)
CALCIUM ALBUM COR SERPL-MCNC: 9.1 MG/DL (ref 8.3–10.1)
CALCIUM SERPL-MCNC: 7.9 MG/DL (ref 8.4–10.2)
CHLORIDE SERPL-SCNC: 106 MMOL/L (ref 96–108)
CO2 SERPL-SCNC: 27 MMOL/L (ref 21–32)
CREAT SERPL-MCNC: 0.39 MG/DL (ref 0.6–1.3)
ERYTHROCYTE [DISTWIDTH] IN BLOOD BY AUTOMATED COUNT: 15 % (ref 11.6–15.1)
FERRITIN SERPL-MCNC: 313 NG/ML (ref 8–388)
GFR SERPL CREATININE-BSD FRML MDRD: 127 ML/MIN/1.73SQ M
GLUCOSE SERPL-MCNC: 108 MG/DL (ref 65–140)
HCT VFR BLD AUTO: 27.6 % (ref 34.8–46.1)
HGB BLD-MCNC: 9.3 G/DL (ref 11.5–15.4)
IRON SATN MFR SERPL: 33 % (ref 15–50)
IRON SERPL-MCNC: 52 UG/DL (ref 50–170)
MAGNESIUM SERPL-MCNC: 2.1 MG/DL (ref 1.9–2.7)
MCH RBC QN AUTO: 41.3 PG (ref 26.8–34.3)
MCHC RBC AUTO-ENTMCNC: 33.7 G/DL (ref 31.4–37.4)
MCV RBC AUTO: 123 FL (ref 82–98)
PHOSPHATE SERPL-MCNC: 2.1 MG/DL (ref 2.7–4.5)
PLATELET # BLD AUTO: 92 THOUSANDS/UL (ref 149–390)
PMV BLD AUTO: 11 FL (ref 8.9–12.7)
POTASSIUM SERPL-SCNC: 3.3 MMOL/L (ref 3.5–5.3)
PROT SERPL-MCNC: 5 G/DL (ref 6.4–8.4)
RBC # BLD AUTO: 2.25 MILLION/UL (ref 3.81–5.12)
SODIUM SERPL-SCNC: 139 MMOL/L (ref 135–147)
TIBC SERPL-MCNC: 159 UG/DL (ref 250–450)
WBC # BLD AUTO: 3.63 THOUSAND/UL (ref 4.31–10.16)

## 2022-11-24 RX ORDER — ECHINACEA PURPUREA EXTRACT 125 MG
1 TABLET ORAL
Status: DISCONTINUED | OUTPATIENT
Start: 2022-11-24 | End: 2022-11-25 | Stop reason: HOSPADM

## 2022-11-24 RX ORDER — POTASSIUM CHLORIDE 20 MEQ/1
40 TABLET, EXTENDED RELEASE ORAL DAILY
Status: DISCONTINUED | OUTPATIENT
Start: 2022-11-24 | End: 2022-11-25 | Stop reason: HOSPADM

## 2022-11-24 RX ADMIN — PANTOPRAZOLE SODIUM 40 MG: 40 TABLET, DELAYED RELEASE ORAL at 06:38

## 2022-11-24 RX ADMIN — DIBASIC SODIUM PHOSPHATE, MONOBASIC POTASSIUM PHOSPHATE AND MONOBASIC SODIUM PHOSPHATE 1 TABLET: 852; 155; 130 TABLET ORAL at 17:27

## 2022-11-24 RX ADMIN — CHLORDIAZEPOXIDE HYDROCHLORIDE 10 MG: 5 CAPSULE ORAL at 21:14

## 2022-11-24 RX ADMIN — LIDOCAINE 5% 1 PATCH: 700 PATCH TOPICAL at 08:44

## 2022-11-24 RX ADMIN — THIAMINE HYDROCHLORIDE 500 MG: 100 INJECTION, SOLUTION INTRAMUSCULAR; INTRAVENOUS at 21:14

## 2022-11-24 RX ADMIN — FOLIC ACID 1 MG: 1 TABLET ORAL at 08:41

## 2022-11-24 RX ADMIN — CHLORDIAZEPOXIDE HYDROCHLORIDE 10 MG: 5 CAPSULE ORAL at 14:52

## 2022-11-24 RX ADMIN — THIAMINE HYDROCHLORIDE 500 MG: 100 INJECTION, SOLUTION INTRAMUSCULAR; INTRAVENOUS at 09:46

## 2022-11-24 RX ADMIN — VITAM B12 100 MCG: 100 TAB at 08:41

## 2022-11-24 RX ADMIN — DIBASIC SODIUM PHOSPHATE, MONOBASIC POTASSIUM PHOSPHATE AND MONOBASIC SODIUM PHOSPHATE 1 TABLET: 852; 155; 130 TABLET ORAL at 11:46

## 2022-11-24 RX ADMIN — MULTIPLE VITAMINS W/ MINERALS TAB 1 TABLET: TAB ORAL at 08:41

## 2022-11-24 RX ADMIN — THIAMINE HYDROCHLORIDE 500 MG: 100 INJECTION, SOLUTION INTRAMUSCULAR; INTRAVENOUS at 17:27

## 2022-11-24 RX ADMIN — SALINE NASAL SPRAY 1 SPRAY: 1.5 SOLUTION NASAL at 15:49

## 2022-11-24 RX ADMIN — GLYCERIN 1 DROP: .002; .002; .01 SOLUTION/ DROPS OPHTHALMIC at 15:49

## 2022-11-24 RX ADMIN — DIBASIC SODIUM PHOSPHATE, MONOBASIC POTASSIUM PHOSPHATE AND MONOBASIC SODIUM PHOSPHATE 1 TABLET: 852; 155; 130 TABLET ORAL at 21:14

## 2022-11-24 RX ADMIN — POTASSIUM CHLORIDE 40 MEQ: 1500 TABLET, EXTENDED RELEASE ORAL at 08:41

## 2022-11-24 RX ADMIN — CHLORDIAZEPOXIDE HYDROCHLORIDE 10 MG: 5 CAPSULE ORAL at 06:38

## 2022-11-24 RX ADMIN — DIBASIC SODIUM PHOSPHATE, MONOBASIC POTASSIUM PHOSPHATE AND MONOBASIC SODIUM PHOSPHATE 1 TABLET: 852; 155; 130 TABLET ORAL at 08:41

## 2022-11-24 NOTE — ASSESSMENT & PLAN NOTE
Recent Labs     11/22/22  1354 11/23/22  0513 11/24/22  0518   PLT 98* 83* 92*     Likely 2/2 bone marrow suppression in the setting of chronic alcohol dependence  Trend CBC

## 2022-11-24 NOTE — PLAN OF CARE
Problem: MOBILITY - ADULT  Goal: Maintain or return to baseline ADL function  Description: INTERVENTIONS:  -  Assess patient's ability to carry out ADLs; assess patient's baseline for ADL function and identify physical deficits which impact ability to perform ADLs (bathing, care of mouth/teeth, toileting, grooming, dressing, etc )  - Assess/evaluate cause of self-care deficits   - Assess range of motion  - Assess patient's mobility; develop plan if impaired  - Assess patient's need for assistive devices and provide as appropriate  - Encourage maximum independence but intervene and supervise when necessary  - Involve family in performance of ADLs  - Assess for home care needs following discharge   - Consider OT consult to assist with ADL evaluation and planning for discharge  - Provide patient education as appropriate  Outcome: Progressing  Goal: Maintains/Returns to pre admission functional level  Description: INTERVENTIONS:  - Perform BMAT or MOVE assessment daily    - Set and communicate daily mobility goal to care team and patient/family/caregiver  - Collaborate with rehabilitation services on mobility goals if consulted  - Perform Range of Motion   - Reposition patient every 2 hours    - Dangle patient   - Stand patient  - Ambulate patient   - Out of bed to chair   - Out of bed for meals   - Out of bed for toileting  - Record patient progress and toleration of activity level   Outcome: Progressing     Problem: Potential for Falls  Goal: Patient will remain free of falls  Description: INTERVENTIONS:  - Educate patient/family on patient safety including physical limitations  - Instruct patient to call for assistance with activity   - Consult OT/PT to assist with strengthening/mobility   - Keep Call bell within reach  - Keep bed low and locked with side rails adjusted as appropriate  - Keep care items and personal belongings within reach  - Initiate and maintain comfort rounds  - Make Fall Risk Sign visible to staff  - Offer Toileting every 2 Hours, in advance of need  - Initiate/Maintain bed/chair alarm  - Obtain necessary fall risk management equipment  - Apply yellow socks and bracelet for high fall risk patients  - Consider moving patient to room near nurses station  Outcome: Progressing

## 2022-11-24 NOTE — ASSESSMENT & PLAN NOTE
Per family patient has been a heavy drinker for past 7-8 years since the passing of her   She drinks upwards of 1 bottle of wine daily  Last drink prior to admission was in the morning when she had one glass of wine  She denies any recent weaning of her alcohol use  She denies history of withdrawal symptoms  She is now presenting with acute anxiety, tactile hallucinations, and palpitations  Also demonstrating signs of Wernicke's encephalopathy  Plan:  Recommend complete abstinence from alcohol and possible inpatient rehab  Pt currently in contemplative stage  Continue discussions with pt regarding importance of quitting alcohol due to the multi-system damage it has been causing  Strongly advise removing all alcohol and benzodiazepines from home     CIWA protocol  Seizure precautions  Daily thiamine and folate  Daily multivitamin  Trend LFTs, platelets  Psychiatry following; appreciate recommendations

## 2022-11-24 NOTE — ASSESSMENT & PLAN NOTE
Occurred this morning (11/24) at 9:24 AM and 9 consecutive beats of NSVT seen on telemetry  Pt was asymptomatic  She has no known hx arrhythmias or CHF  Possibly 2/2 known electrolyte imbalances, anemia, alcohol withdrawal and thiamine deficiency  There is the possibility of structural heart disease as well  If there is recurrence, may need to investigate further e g  with cardiac imaging      Plan:  Continue to monitor on telemetry  Replete to maintain K > 4 and Mag >2

## 2022-11-24 NOTE — PROGRESS NOTES
[facility]  Progress Note Ritesh Deras 1975, 41 y o  female MRN: [id number]  Unit/Bed#: S -01 Encounter: [id number]  Primary Care Provider: Nelson Lemus DO   Date and time admitted to hospital: 11/22/2022  1:05 PM    * Alcohol dependence with withdrawal [facility])  Assessment & Plan  Per family patient has been a heavy drinker for past 7-8 years since the passing of her   She drinks upwards of 1 bottle of wine daily  Last drink prior to admission was in the morning when she had one glass of wine  She denies any recent weaning of her alcohol use  She denies history of withdrawal symptoms  She is now presenting with acute anxiety, tactile hallucinations, and palpitations  Also demonstrating signs of Wernicke's encephalopathy  Plan:  Recommend complete abstinence from alcohol and possible inpatient rehab  Pt currently in contemplative stage  Continue discussions with pt regarding importance of quitting alcohol due to the multi-system damage it has been causing  Strongly advise removing all alcohol and benzodiazepines from home  CIWA protocol  Seizure precautions  Daily thiamine and folate  Daily multivitamin  Trend LFTs, platelets  Psychiatry following; appreciate recommendations    Wernicke's encephalopathy  Assessment & Plan  Pt presenting with nystagmus, ataxia and brain fog  Also showing cerebellar dysfunction on physical exam  Associated with heavy chronic alcohol use  Confusion does appear to be improving  Pt failed the String Test today so Korsakoff syndrome highly unlikely  Plan: Thiamine 500 mg IV TID x 2 days  Then 250 mg IV/IM qd x 5 days    CIWA protocol  Fall precautions        Transaminitis  Assessment & Plan  Recent Labs     11/22/22  1354 11/23/22  0513 11/24/22  0518   * 169* 162*   ALT 36 29 28   ALKPHOS 517* 389* 381*   TBILI 1 91* 2 15* 1 35*   BILIDIR 0 84*  --   --      Likely alcohol induced liver injury rather than NAFLD    Plan:  See plans for Wernicke's encephalopathy, Alcohol dependence  Trend LFTs    Macrocytic anemia  Assessment & Plan  Recent Labs     22  1354 22  0513 22  0518   HGB 11 2* 9 4* 9 3*     Lab Results   Component Value Date    [id number] 229 2022       Pt has history of iron deficiency anemia due to poor oral intake of iron  She has history of B12 deficiency  CBC in early  showing microcytic anemia with MCV 78  However this admission she is showing macrocytosis w/  Likely combination of heavy alcohol consumption with possible concurrent B12 and/or folate deficiency    Plan:  Replete B12 if < 250  Daily 1 mg folate   Trend HgB  Transfuse if HgB < 7        Thrombocytopenia [facility])  Assessment & Plan  Recent Labs     22  1354 22  0513 22  0518   PLT 98* 83* 92*     Likely 2/2 bone marrow suppression in the setting of chronic alcohol dependence  Trend CBC    Insomnia  Assessment & Plan  Likely 2/2 heavy alcohol use  Pt has history of self medicating with Xanax or, more recently, alcohol  Plan:  Melatonin qhs prn  Librium q8H        VTE Pharmacologic Prophylaxis:   VTE Score: 2 Low Risk (Score 0-2) - Encourage Ambulation  Mechanical VTE Prophylaxis in Place: Yes    Patient Centered Rounds: I have performed bedside rounds with nursing staff today  Discussions with Specialists or Other Care Team Provider: None      Current Length of Stay: 2 day(s)    Current Patient Status: Inpatient     Discharge Plan / Estimated Discharge Date: Anticipate discharge in > 72 hrs to discharge location to be determined pending rehab evaluations  Code Status: Prior      Subjective:   Pt more alert and oriented today  Reports improvement in brain fog  She has not noted tremulousness today  Reports good appetite  No N/V, no palpitations, SOB, CP, dizziness, weakness       Objective:     Vitals:   Temp (24hrs), Av 4 °F (37 4 °C), Min:99 °F (37 2 °C), Max:100 °F (37 8 °C)    Temp:  [99 °F (37 2 °C)-100 °F (37 8 °C)] 99 2 °F (37 3 °C)  HR:  [110-124] 110  Resp:  [16] 16  BP: (108-122)/(60-81) 108/61  SpO2:  [95 %-97 %] 96 %  Body mass index is 30 96 kg/m²  Input and Output Summary (last 24 hours):     No intake or output data in the 24 hours ending 11/24/22 1054    Physical Exam:     Physical Exam  Vitals and nursing note reviewed  Constitutional:       General: She is not in acute distress  Appearance: Normal appearance  She is obese  She is not ill-appearing or diaphoretic  HENT:      Head: Normocephalic and atraumatic  Right Ear: External ear normal       Left Ear: External ear normal       Mouth/Throat:      Mouth: Mucous membranes are moist       Pharynx: Oropharynx is clear  Eyes:      Extraocular Movements: Extraocular movements intact  Right eye: Nystagmus present  Normal extraocular motion  Left eye: Nystagmus present  Normal extraocular motion  Conjunctiva/sclera: Conjunctivae normal       Pupils: Pupils are equal, round, and reactive to light  Cardiovascular:      Rate and Rhythm: Regular rhythm  Tachycardia present  Pulses: Normal pulses  Heart sounds: Normal heart sounds  Pulmonary:      Effort: Pulmonary effort is normal  No respiratory distress  Breath sounds: Normal breath sounds  Abdominal:      General: Abdomen is flat  Bowel sounds are normal       Palpations: Abdomen is soft  Musculoskeletal:         General: No swelling, tenderness or deformity  Right lower leg: Edema present  Left lower leg: Edema present  Skin:     General: Skin is warm  Capillary Refill: Capillary refill takes less than 2 seconds  Neurological:      Mental Status: She is alert and oriented to person, place, and time  Cranial Nerves: Cranial nerves 2-12 are intact  No dysarthria  Motor: Tremor present  No seizure activity or pronator drift        Comments: Fine tremor, improved from prior   Psychiatric: Mood and Affect: Mood normal          Behavior: Behavior normal          Cognition and Memory: Cognition is impaired  Memory is impaired  Additional Data:     Labs:  Results from last 7 days   Lab Units 11/24/22  0518 11/23/22  0513 11/22/22  1354   WBC Thousand/uL 3 63*   < > 4 48   HEMOGLOBIN g/dL 9 3*   < > 11 2*   HEMATOCRIT % 27 6*   < > 32 9*   PLATELETS Thousands/uL 92*   < > 98*   NEUTROS PCT %  --   --  80*   LYMPHS PCT %  --   --  14   MONOS PCT %  --   --  4   EOS PCT %  --   --  0    < > = values in this interval not displayed  Results from last 7 days   Lab Units 11/24/22  0518   SODIUM mmol/L 139   POTASSIUM mmol/L 3 3*   CHLORIDE mmol/L 106   CO2 mmol/L 27   BUN mg/dL 3*   CREATININE mg/dL 0 39*   ANION GAP mmol/L 6   CALCIUM mg/dL 7 9*   ALBUMIN g/dL 2 5*   TOTAL BILIRUBIN mg/dL 1 35*   ALK PHOS U/L 381*   ALT U/L 28   AST U/L 162*   GLUCOSE RANDOM mg/dL 108     Results from last 7 days   Lab Units 11/22/22  2010   INR  1 01     Results from last 7 days   Lab Units 11/22/22  1354   POC GLUCOSE mg/dl 164*               Imaging:   US right upper quadrant   Final Result by Montrell Madison MD (11/22 1623)      1  Severe hepatomegaly with moderate hepatic steatosis and a mildly nodular liver surface contour, suggesting early cirrhotic changes  2   Status post cholecystectomy  Workstation performed: [id number]         CT head without contrast   Final Result by David Bass MD (11/22 1373)      No acute intracranial abnormality  Workstation performed: [id number]         XR chest 1 view portable   ED Interpretation by Amaury Huang DO (11/22 6652)   No acute cardiopulmonary disease  Final Result by Reinaldo Gonzalez MD (99/63 [id number])      No acute cardiopulmonary disease                    Workstation performed: [id number]               Recent Cultures (last 7 days):           Lines/Drains:  Invasive Devices     Peripheral Intravenous Line  Duration Peripheral IV 11/22/22 Left Antecubital 1 day                Telemetry:   Telemetry Orders (From admission, onward)             48 Hour Telemetry Monitoring  Continuous x 48 hours        Expiring   References:    Telemetry Guidelines   Question:  Reason for 48 Hour Telemetry  Answer:  Arrhythmias Requiring Medical Therapy (eg  SVT, Vtach/fib, Bradycardia, Uncontrolled A-fib)                    Last 24 Hours Medication List:   Current Facility-Administered Medications   Medication Dose Route Frequency Provider Last Rate   • chlordiazePOXIDE  10 mg Oral Q8H Dhiraj 30, DO     • cyanocobalamin  100 mcg Oral Daily Renee Blackburn,      • folic acid  1 mg Oral Daily Catracho Chirinos MD     • lidocaine  1 patch Topical Daily Catracho Chirinos MD     • multivitamin-minerals  1 tablet Oral Daily Catracho Chirinos MD     • pantoprazole  40 mg Oral Early Morning Catracho Chirinos MD     • potassium chloride  40 mEq Oral Daily Catracho Chirinos MD     • potassium-sodium phosphateS  1 tablet Oral 4x Daily (with meals and at bedtime) Catracho Chirinos MD     • [START ON 11/26/2022] thiamine  250 mg Intravenous Daily Catracho Chirinos MD     • thiamine  500 mg Intravenous TID Catracho Chirinos  mg (11/24/22 0946)        Today, Patient Was Seen By: Catracho Chirinos MD    ** Please Note: This note has been constructed using a voice recognition system   **

## 2022-11-24 NOTE — ED ATTENDING ATTESTATION
11/22/2022  ILuiz DO, saw and evaluated the patient  I have discussed the patient with the resident/non-physician practitioner and agree with the resident's/non-physician practitioner's findings, Plan of Care, and MDM as documented in the resident's/non-physician practitioner's note, except where noted  All available labs and Radiology studies were reviewed  I was present for key portions of any procedure(s) performed by the resident/non-physician practitioner and I was immediately available to provide assistance  At this point I agree with the current assessment done in the Emergency Department  I have conducted an independent evaluation of this patient a history and physical is as follows:    51-year-old female coming into the ED for evaluation of anxiety, weakness, tremors, shaking, decreased appetite, increased thirst   She states she drinks lot of water, complains of brain fog, at times feels confused forgetful  She drinks at least 1 bottle of wine daily over several years  She states she did drink last night  She states that she feels like she has been trying to cut down her alcohol intake  Denies SI or HI, no other drugs  On physical exam: pt is ill appearing, but in NAD  mucous membranes moist   CTA b/l , heart tachycardic, regular rhythm  Abdomen NT, ND, no R/R/G  Neuro intact, gcs 15  Cap refill < 2 sec, skin warm and dry  Admit for suspected acute alcohol withdrawal, encephalopathy, possible wernicke's  Evidence of cirrhosis on labs and imaging  IV ativan given in ED w/ improvement in symptoms      ED Course         Critical Care Time  Procedures

## 2022-11-24 NOTE — ASSESSMENT & PLAN NOTE
Recent Labs     11/22/22  1354 11/23/22  0513 11/24/22  0518   * 169* 162*   ALT 36 29 28   ALKPHOS 517* 389* 381*   TBILI 1 91* 2 15* 1 35*   BILIDIR 0 84*  --   --      Likely alcohol induced liver injury rather than NAFLD    Plan:  See plans for Wernicke's encephalopathy, Alcohol dependence  Trend LFTs

## 2022-11-24 NOTE — ASSESSMENT & PLAN NOTE
Recent Labs     11/22/22  1354 11/23/22  0513 11/24/22  0518   HGB 11 2* 9 4* 9 3*     Lab Results   Component Value Date    IFVUNHWR10 229 11/22/2022       Pt has history of iron deficiency anemia due to poor oral intake of iron  She has history of B12 deficiency  CBC in early 2019 showing microcytic anemia with MCV 78  However this admission she is showing macrocytosis w/  Likely combination of heavy alcohol consumption with possible concurrent B12 and/or folate deficiency    Plan:  Replete B12 if < 250  Daily 1 mg folate   Trend HgB   Transfuse if HgB < 7

## 2022-11-24 NOTE — ASSESSMENT & PLAN NOTE
Pt presenting with nystagmus, ataxia and brain fog  Also showing cerebellar dysfunction on physical exam  Associated with heavy chronic alcohol use  Confusion does appear to be improving  Pt failed the String Test today so Korsakoff syndrome highly unlikely  Plan: Thiamine 500 mg IV TID x 2 days  Then 250 mg IV/IM qd x 5 days    CIWA protocol  Fall precautions

## 2022-11-25 LAB
ALBUMIN SERPL BCP-MCNC: 2.5 G/DL (ref 3.5–5)
ALP SERPL-CCNC: 338 U/L (ref 34–104)
ALT SERPL W P-5'-P-CCNC: 31 U/L (ref 7–52)
ANION GAP SERPL CALCULATED.3IONS-SCNC: 8 MMOL/L (ref 4–13)
AST SERPL W P-5'-P-CCNC: 174 U/L (ref 13–39)
BILIRUB SERPL-MCNC: 1.22 MG/DL (ref 0.2–1)
BUN SERPL-MCNC: 6 MG/DL (ref 5–25)
CALCIUM ALBUM COR SERPL-MCNC: 8.9 MG/DL (ref 8.3–10.1)
CALCIUM SERPL-MCNC: 7.7 MG/DL (ref 8.4–10.2)
CHLORIDE SERPL-SCNC: 108 MMOL/L (ref 96–108)
CO2 SERPL-SCNC: 24 MMOL/L (ref 21–32)
CREAT SERPL-MCNC: 0.37 MG/DL (ref 0.6–1.3)
ERYTHROCYTE [DISTWIDTH] IN BLOOD BY AUTOMATED COUNT: 15.4 % (ref 11.6–15.1)
GFR SERPL CREATININE-BSD FRML MDRD: 129 ML/MIN/1.73SQ M
GLUCOSE SERPL-MCNC: 99 MG/DL (ref 65–140)
HCT VFR BLD AUTO: 27.7 % (ref 34.8–46.1)
HGB BLD-MCNC: 9.2 G/DL (ref 11.5–15.4)
MAGNESIUM SERPL-MCNC: 2.1 MG/DL (ref 1.9–2.7)
MCH RBC QN AUTO: 41.4 PG (ref 26.8–34.3)
MCHC RBC AUTO-ENTMCNC: 33.2 G/DL (ref 31.4–37.4)
MCV RBC AUTO: 125 FL (ref 82–98)
PHOSPHATE SERPL-MCNC: 3.6 MG/DL (ref 2.7–4.5)
PLATELET # BLD AUTO: 111 THOUSANDS/UL (ref 149–390)
PMV BLD AUTO: 11 FL (ref 8.9–12.7)
POTASSIUM SERPL-SCNC: 3.3 MMOL/L (ref 3.5–5.3)
PROT SERPL-MCNC: 4.9 G/DL (ref 6.4–8.4)
RBC # BLD AUTO: 2.22 MILLION/UL (ref 3.81–5.12)
SODIUM SERPL-SCNC: 140 MMOL/L (ref 135–147)
WBC # BLD AUTO: 4.42 THOUSAND/UL (ref 4.31–10.16)

## 2022-11-25 RX ORDER — CHLORDIAZEPOXIDE HYDROCHLORIDE 10 MG/1
CAPSULE, GELATIN COATED ORAL
Qty: 24 CAPSULE | Refills: 0 | Status: SHIPPED | OUTPATIENT
Start: 2022-11-25 | End: 2022-12-07

## 2022-11-25 RX ORDER — GAUZE BANDAGE 2" X 2"
100 BANDAGE TOPICAL DAILY
Refills: 0
Start: 2022-12-01 | End: 2022-12-31

## 2022-11-25 RX ORDER — FOLIC ACID 1 MG/1
1 TABLET ORAL DAILY
Qty: 30 TABLET | Refills: 0 | Status: SHIPPED | OUTPATIENT
Start: 2022-11-26

## 2022-11-25 RX ORDER — POTASSIUM CHLORIDE 20 MEQ/1
40 TABLET, EXTENDED RELEASE ORAL ONCE
Status: DISCONTINUED | OUTPATIENT
Start: 2022-11-25 | End: 2022-11-25

## 2022-11-25 RX ORDER — POTASSIUM CHLORIDE 20 MEQ/1
40 TABLET, EXTENDED RELEASE ORAL ONCE
Status: COMPLETED | OUTPATIENT
Start: 2022-11-25 | End: 2022-11-25

## 2022-11-25 RX ORDER — LANOLIN ALCOHOL/MO/W.PET/CERES
3 CREAM (GRAM) TOPICAL
Status: DISCONTINUED | OUTPATIENT
Start: 2022-11-25 | End: 2022-11-25 | Stop reason: HOSPADM

## 2022-11-25 RX ADMIN — POTASSIUM CHLORIDE 40 MEQ: 1500 TABLET, EXTENDED RELEASE ORAL at 09:04

## 2022-11-25 RX ADMIN — POTASSIUM CHLORIDE 40 MEQ: 1500 TABLET, EXTENDED RELEASE ORAL at 09:00

## 2022-11-25 RX ADMIN — PANTOPRAZOLE SODIUM 40 MG: 40 TABLET, DELAYED RELEASE ORAL at 05:28

## 2022-11-25 RX ADMIN — MULTIPLE VITAMINS W/ MINERALS TAB 1 TABLET: TAB ORAL at 09:00

## 2022-11-25 RX ADMIN — CHLORDIAZEPOXIDE HYDROCHLORIDE 10 MG: 5 CAPSULE ORAL at 05:28

## 2022-11-25 RX ADMIN — THIAMINE HYDROCHLORIDE 500 MG: 100 INJECTION, SOLUTION INTRAMUSCULAR; INTRAVENOUS at 09:14

## 2022-11-25 RX ADMIN — VITAM B12 100 MCG: 100 TAB at 09:00

## 2022-11-25 RX ADMIN — LIDOCAINE 5% 1 PATCH: 700 PATCH TOPICAL at 09:01

## 2022-11-25 RX ADMIN — FOLIC ACID 1 MG: 1 TABLET ORAL at 09:00

## 2022-11-25 NOTE — DISCHARGE INSTR - AVS FIRST PAGE
Dear Charanjit Suarez,     It was our pleasure to care for you here at [facility]  It is our hope that we were always able to exceed the expected standards for your care during your stay  You were hospitalized due to alcohol withdrawal   You were cared for on the 3rd floor by Colletta Games, MD under the service of Meghan Lynn DO with the [facility] who covers for your primary care physician (PCP), Ofelia Lemus DO, while you were hospitalized  If you have any questions or concerns related to this hospitalization, you may contact us at [phone]  For follow up as well as any medication refills, we recommend that you follow up with your primary care physician  A registered nurse will reach out to you by phone within a few days after your discharge to answer any additional questions that you may have after going home  However, at this time we provide for you here, the most important instructions / recommendations at discharge:       Notable Medication Adjustments -   Librium taper: You will be taking Librium (chlordiazepoxide) for the next 12 days after discharge from the hospital  This needs to be slowly weaned down to prevent withdrawal symptoms  For the next 4 days you will continue to take this three times a day, every 8 hours  On the 5th day you will only take it two times a day, every 12 hours  You will take it twice a day for a total of 4 days  Then on the 9th day you will only take it once per day  You will take it once per day for 4 days  Do not drink alcohol while taking the librium  This can lead to sleepiness, lethargy, confusion, memory issues/loss, or loss of consciousness  You also should not drive while taking the librium as it is equivalent to driving under the influence of alcohol  One of the reasons you were admitted was thiamine (B1) deficiency  You will need to take 250 mg of thiamine for 5 days  This is being prescribed to you  Afterwards you need to obtain a 100 mg thiamine supplement  This can be found at most pharmacies  You will need to take one of these daily for 30 days  Take daily B12 supplement  Take daily folate supplement  Take your multivitamin daily    Testing Required after Discharge -   None  Important follow up information -   PCP  Other Instructions -   You are no longer withdrawing from alcohol  If you resume drinking alcohol, you may have recurrence of these symptoms  You also have advanced liver damage, multiple vitamin deficiencies, and anemia from your alcohol use in addition to brain damage causing this confusion and trouble walking  We strongly advise you to completely abstain from alcohol and try to avoid situations that remind you of alcohol consumption  Please review this entire after visit summary as additional general instructions including medication list, appointments, activity, diet, any pertinent wound care, and other additional recommendations from your care team that may be provided for you        Sincerely,     Miranda Krishnan MD

## 2022-11-25 NOTE — PLAN OF CARE
Problem: MOBILITY - ADULT  Goal: Maintain or return to baseline ADL function  Description: INTERVENTIONS:  -  Assess patient's ability to carry out ADLs; assess patient's baseline for ADL function and identify physical deficits which impact ability to perform ADLs (bathing, care of mouth/teeth, toileting, grooming, dressing, etc )  - Assess/evaluate cause of self-care deficits   - Assess range of motion  - Assess patient's mobility; develop plan if impaired  - Assess patient's need for assistive devices and provide as appropriate  - Encourage maximum independence but intervene and supervise when necessary  - Involve family in performance of ADLs  - Assess for home care needs following discharge   - Consider OT consult to assist with ADL evaluation and planning for discharge  - Provide patient education as appropriate  11/25/2022 0327 by Shivam Braden  Outcome: Progressing  11/25/2022 0327 by Shivam Braden  Outcome: Progressing  Goal: Maintains/Returns to pre admission functional level  Description: INTERVENTIONS:  - Perform BMAT or MOVE assessment daily    - Set and communicate daily mobility goal to care team and patient/family/caregiver  - Collaborate with rehabilitation services on mobility goals if consulted  - Perform Range of Motion times a day  - Reposition patient every  hours    - Dangle patient  times a day  - Stand patient  times a day  - Ambulate patient  times a day  - Out of bed to chair  times a day   - Out of bed for meals  times a day  - Out of bed for toileting  - Record patient progress and toleration of activity level   11/25/2022 0327 by Shivam Braden  Outcome: Progressing  11/25/2022 0327 by Shivam Braden  Outcome: Progressing     Problem: Potential for Falls  Goal: Patient will remain free of falls  Description: INTERVENTIONS:  - Educate patient/family on patient safety including physical limitations  - Instruct patient to call for assistance with activity   - Consult OT/PT to assist with strengthening/mobility   - Keep Call bell within reach  - Keep bed low and locked with side rails adjusted as appropriate  - Keep care items and personal belongings within reach  - Initiate and maintain comfort rounds  - Make Fall Risk Sign visible to staff  - Offer Toileting every  Hours, in advance of need  - Initiate/Maintain alarm  - Obtain necessary fall risk management equipment:   - Apply yellow socks and bracelet for high fall risk patients  - Consider moving patient to room near nurses station  11/25/2022 0327 by Chacho Gallardo  Outcome: Progressing  11/25/2022 0327 by Chacho Gallardo  Outcome: Progressing

## 2022-11-25 NOTE — UTILIZATION REVIEW
Continued Stay Review    Date: 11/24/22                         Current Patient Class: Inpatient  Current Level of Care: Med Surg    HPI:47 y o  female initially admitted on 11/22     Assessment/Plan:     11/24: Recommend complete abstinence from alcohol and possible inpatient rehab  Pt currently in contemplative stage  Continue discussions with pt regarding importance of quitting alcohol due to the multi-system damage it has been causing  Strongly advise removing all alcohol and benzodiazepines from home  Continue CIWA protocol, seizure precautions, daily  folate, MVI, Thiamine 500 mg IV TID x 2 days  Then 250 mg IV/IM qd x 5 days, trend LFTs       Vital Signs:     Date/Time Temp Pulse Resp BP MAP (mmHg) SpO2 O2 Device   11/25/22 07:05:01 99 1 °F (37 3 °C) 109 Abnormal  -- 101/67 78 98 % --   11/24/22 21:01:28 99 7 °F (37 6 °C) 116 Abnormal  18 105/66 79 96 % --   11/24/22 1700 99 3 °F (37 4 °C) 120 Abnormal  18 127/80 -- 97 % --   11/24/22 1300 99 °F (37 2 °C) 115 Abnormal  16 104/66 79 97 % None (Room air)   11/24/22 07:37:41 99 2 °F (37 3 °C) 110 Abnormal  16 108/61 77 96 % --     CIWA-Ar   /67  -DI -- -- -- 105/66  -DI   Pulse 109 Abnormal   -DI -- -- -- 116 Abnormal   -DI   Nausea and Vomiting -- 0  -KY 0  -KY 0  -KY --   Tactile Disturbances -- 0  -KY 0  -KY 0  -KY --   Tremor -- 0  -KY 0  -KY 0  -KY --   Auditory Disturbances -- 0  -KY 0  -KY 0  -KY --   Paroxysmal Sweats -- 0  -KY 0  -KY 0  -KY --   Visual Disturbances -- 0  -KY 0  -KY 0  -KY --   Anxiety -- 0  -KY 0  -KY 0  -KY --   Headache, Fullness in Head -- 0  -KY 0  -KY 0  -KY --   Agitation -- 0  -KY 0  -KY 0  -KY --   Orientation and Clouding of Sensorium -- 0  -KY 0  -KY 0  -KY --   CIWA-Ar Total -- 0  -KY 0  -KY 0  -KY --   Row Name 11/24/22 1700 11/24/22 1550 11/24/22 1300 11/24/22 0800 11/24/22 07:37:41   Mahesh   /80  -MD -- 104/66  -GR -- 108/61  -DI   Pulse 120 Abnormal   -MD -- 115 Abnormal   -GR -- 110 Abnormal   -DI   Nausea and Vomiting 0  -SW 0  -SW 0  -SW 0  -SW --   Tactile Disturbances 0  -SW 0  -SW 0  -SW 1  -SW --   Tremor 0  -SW 0  -SW 0  -SW 0  -SW --   Auditory Disturbances 0  -SW 0  -SW 0  -SW 0  -SW --   Paroxysmal Sweats 0  -SW 0  -SW 0  -SW 0  -SW --   Visual Disturbances 0  -SW 0  -SW 0  -SW 0  -SW --   Anxiety 0  -SW 4  -SW 0  -SW 2  -SW --   Headache, Fullness in Head 0  -SW 0  -SW 0  -SW 0  -SW --   Agitation 0  -SW 1  -SW 0  -SW 0  -SW --   Orientation and Clouding of Sensorium 0  -SW 0  -SW 0  -SW 0  -SW --   CIWA-Ar Total 0  -SW 5  -SW 0  -SW 3  -SW --   Row Name 11/24/22 0530 11/24/22 0130 11/23/22 21:39:55 11/23/22 2133 11/23/22 21:00:17   CIWA-Ar   BP -- -- 112/60  -DI -- 118/71  -DI   Pulse -- -- 118 Abnormal   -DI -- 116 Abnormal   -DI   Nausea and Vomiting 0  -KY 0  -KY -- 0  -KY --   Tactile Disturbances 0  -KY 0  -KY -- 0  -KY --   Tremor 0  -KY 0  -KY -- 1  -KY --   Auditory Disturbances 0  -KY 0  -KY -- 0  -KY --   Paroxysmal Sweats 0  -KY 0  -KY -- 0  -KY --   Visual Disturbances 0  -KY 0  -KY -- 0  -KY --   Anxiety 0  -KY 0  -KY -- 2  -KY --   Headache, Fullness in Head 0  -KY 0  -KY -- 0  -KY --   Agitation 0  -KY 0  -KY -- 1  -KY --   Orientation and Clouding of Sensorium 0  -KY 0  -KY -- 0  -KY --   CIWA-Ar Total 0  -KY 0  -KY -- 4  -KY --   Row Name 11/23/22 2030 11/23/22 2000 11/23/22 15:00:16 11/23/22 1400 11/23/22 1052   CIWA-Ar       Pertinent Labs/Diagnostic Results:   Results from last 7 days   Lab Units 11/22/22  1613   SARS-COV-2  Negative     Results from last 7 days   Lab Units 11/25/22  0437 11/24/22  0518 11/23/22  0513 11/22/22  1354   WBC Thousand/uL 4 42 3 63* 3 22* 4 48   HEMOGLOBIN g/dL 9 2* 9 3* 9 4* 11 2*   HEMATOCRIT % 27 7* 27 6* 28 4* 32 9*   PLATELETS Thousands/uL 111* 92* 83* 98*   NEUTROS ABS Thousands/µL  --   --   --  3 60         Results from last 7 days   Lab Units 11/25/22  0437 11/24/22  0518 11/23/22  0513 11/22/22  1354   SODIUM mmol/L 140 139 135 135   POTASSIUM mmol/L 3 3* 3 3* 3 0* 3 3*   CHLORIDE mmol/L 108 106 99 93*   CO2 mmol/L 24 27 31 27   ANION GAP mmol/L 8 6 5 15*   BUN mg/dL 6 3* 2* 3*   CREATININE mg/dL 0 37* 0 39* 0 42* 0 45*   EGFR ml/min/1 73sq m 129 127 124 121   CALCIUM mg/dL 7 7* 7 9* 7 6* 8 6   MAGNESIUM mg/dL 2 1 2 1 1 8*  --    PHOSPHORUS mg/dL 3 6 2 1*  --   --      Results from last 7 days   Lab Units 11/25/22  0437 11/24/22  0518 11/23/22  0513 11/22/22  1354   AST U/L 174* 162* 169* 184*   ALT U/L 31 28 29 36   ALK PHOS U/L 338* 381* 389* 517*   TOTAL PROTEIN g/dL 4 9* 5 0* 5 1* 6 2*   ALBUMIN g/dL 2 5* 2 5* 2 5* 3 1*   TOTAL BILIRUBIN mg/dL 1 22* 1 35* 2 15* 1 91*   BILIRUBIN DIRECT mg/dL  --   --   --  0 84*   AMMONIA umol/L  --  83*  --   --      Results from last 7 days   Lab Units 11/22/22  1354   POC GLUCOSE mg/dl 164*     Results from last 7 days   Lab Units 11/25/22 0437 11/24/22  0518 11/23/22  0513 11/22/22  1354   GLUCOSE RANDOM mg/dL 99 108 124 165*         Results from last 7 days   Lab Units 11/22/22  1816 11/22/22  1354 11/22/22  0000   HS TNI 0HR ng/L  --  9  --    HS TNI 2HR ng/L  --   --  9   HSTNI D2 ng/L  --   --  0   HS TNI 4HR ng/L 9  --   --    HSTNI D4 ng/L 0  --   --          Results from last 7 days   Lab Units 11/22/22  2010   PROTIME seconds 13 5   INR  1 01     Results from last 7 days   Lab Units 11/22/22  0000   TSH 3RD GENERATON uIU/mL 5 798*         Results from last 7 days   Lab Units 11/24/22  0518   FERRITIN ng/mL 313         Results from last 7 days   Lab Units 11/22/22  1527   CLARITY UA  Clear   COLOR UA  Yellow   SPEC GRAV UA  1 008   PH UA  7 0   GLUCOSE UA mg/dl Negative   KETONES UA mg/dl Negative   BLOOD UA  Negative   PROTEIN UA mg/dl Negative   NITRITE UA  Negative   BILIRUBIN UA  Negative   UROBILINOGEN UA (BE) mg/dl <2 0   LEUKOCYTES UA  Negative     Results from last 7 days   Lab Units 11/22/22  1613   INFLUENZA A PCR  Negative   INFLUENZA B PCR  Negative   RSV PCR  Negative             Medications: Scheduled Medications:  chlordiazePOXIDE, 10 mg, Oral, Q8H XAVIER  cyanocobalamin, 100 mcg, Oral, Daily  folic acid, 1 mg, Oral, Daily  lidocaine, 1 patch, Topical, Daily  melatonin, 3 mg, Oral, HS  multivitamin-minerals, 1 tablet, Oral, Daily  pantoprazole, 40 mg, Oral, Early Morning  potassium chloride, 40 mEq, Oral, Daily  potassium chloride, 40 mEq, Oral, Once  potassium-sodium phosphateS, 1 tablet, Oral, BID With Meals  [START ON 11/26/2022] thiamine, 250 mg, Intravenous, Daily  thiamine, 500 mg, Intravenous, TID      Continuous IV Infusions:     PRN Meds:  glycerin-hypromellose-, 1 drop, Both Eyes, Q4H PRN  sodium chloride, 1 spray, Each Nare, Q1H PRN        Discharge Plan: [facility]    Network Utilization Review Department  ATTENTION: Please call with any questions or concerns to [phone] and carefully listen to the prompts so that you are directed to the right person  All voicemails are confidential   Ann Herrera all requests for admission clinical reviews, approved or denied determinations and any other requests to dedicated fax number below belonging to the campus where the patient is receiving treatment   List of dedicated fax numbers for the Facilities:  [address] DENIALS (Administrative/Medical Necessity) [phone]   [address] (Maternity/NICU/Pediatrics) [phone]   [address] Lisa Torres [phone]   [facility] [address] [phone]   [address] [address] [facility]rell [facility]  [phone]   [facility] [address] [facility] [phone]

## 2022-11-26 VITALS
RESPIRATION RATE: 17 BRPM | HEIGHT: 66 IN | SYSTOLIC BLOOD PRESSURE: 85 MMHG | DIASTOLIC BLOOD PRESSURE: 57 MMHG | WEIGHT: 191.8 LBS | OXYGEN SATURATION: 99 % | BODY MASS INDEX: 30.82 KG/M2 | TEMPERATURE: 98.7 F | HEART RATE: 80 BPM

## 2022-11-26 NOTE — DISCHARGE SUMMARY
[facility]  Discharge- [facility] 1975, 41 y o  female MRN: [id number]  Unit/Bed#: S -01 Encounter: [id number]  Primary Care Provider: Ofelia Lemus DO   Date and time admitted to hospital: 11/22/2022  1:05 PM    * Alcohol dependence with withdrawal [facility])  Assessment & Plan  Per family patient has been a heavy drinker for past 7-8 years since the passing of her   She drinks upwards of 1 bottle of wine daily  Last drink prior to admission was in the morning when she had one glass of wine  She denies any recent weaning of her alcohol use  She denies history of withdrawal symptoms  She is now presenting with acute anxiety, tactile hallucinations, and palpitations  Also demonstrating signs of Wernicke's encephalopathy  Plan:  Recommend complete abstinence from alcohol and possible inpatient rehab  Pt currently in contemplative stage  Continue discussions with pt regarding importance of quitting alcohol due to the multi-system damage it has been causing  Strongly advise removing all alcohol and benzodiazepines from home  Thiamine repletion (see plan for Wernicke's)  Daily B12 and folate  Daily multivitamin  Follow up with gastroenterology as outpatient    Wernicke's encephalopathy  Assessment & Plan  Pt presenting with nystagmus, ataxia and brain fog  Also showing cerebellar dysfunction on physical exam  Associated with heavy chronic alcohol use  Confusion does appear to be improving  Pt failed the String Test today so Korsakoff syndrome highly unlikely  Plan:  Completed Thiamine 500 mg IV TID x 2 days  Can continue oral supplemetation as outpatient  250 MG qd x 5 days  Then 100 mg qd x 30 days          Transaminitis  Assessment & Plan  Recent Labs     11/23/22  0513 11/24/22  0518 11/25/22  0437   * 162* 174*   ALT 29 28 31   ALKPHOS 389* 381* 338*   TBILI 2 15* 1 35* 1 22*     Likely alcohol induced liver injury rather than NAFLD    Plan:  See plans for Wernicke's encephalopathy, Alcohol dependence  Counseled on abstinence from alcohol    Macrocytic anemia  Assessment & Plan  Recent Labs     11/23/22  0513 11/24/22  0518 11/25/22  0437   HGB 9 4* 9 3* 9 2*     Lab Results   Component Value Date    [id number] 229 11/22/2022       Pt has history of iron deficiency anemia due to poor oral intake of iron  She has history of B12 deficiency  CBC in early 2019 showing microcytic anemia with MCV 78  However this admission she is showing macrocytosis w/  Likely combination of heavy alcohol consumption with possible concurrent B12 and/or folate deficiency    Plan:  Daily B12  Daily folate  Daily multivitamin    Thrombocytopenia ([address] )  Assessment & Plan  Recent Labs     11/23/22  0513 11/24/22  0518 11/25/22  0437   PLT 83* 92* 111*     Likely 2/2 bone marrow suppression in the setting of chronic alcohol dependence  See plan for alcohol dependence    Insomnia  Assessment & Plan  Likely 2/2 heavy alcohol use  Pt has history of self medicating with Xanax or, more recently, alcohol  Plan:  Melatonin qhs prn          Discharging Resident Physician: Missy Burden MD  Attending: No att  providers found  PCP: Natali Lemus DO  Admission Date: 11/22/2022  Discharge Date: 11/25/22    Disposition:     Home    Reason for Admission: Alcohol withdrawal, Wernicke's encephalopathy    Consultations During Hospital Stay:  · Psychiatry    Significant Findings / Test Results:     Recent Labs     11/23/22  0513 11/24/22  0518 11/25/22  0437   * 162* 174*   ALT 29 28 31   ALKPHOS 389* 381* 338*   TBILI 2 15* 1 35* 1 22*       Incidental Findings:   US right upper quadrant   Final Result by Diana Moreno MD (11/22 1623)      1  Severe hepatomegaly with moderate hepatic steatosis and a mildly nodular liver surface contour, suggesting early cirrhotic changes  2   Status post cholecystectomy        Workstation performed: [id number]         CT head without contrast   Final Result by Levander Galeazzi, MD (11/22 3585)      No acute intracranial abnormality  Workstation performed: [id number]         XR chest 1 view portable   ED Interpretation by Alicia Gregory DO (11/22 4486)   No acute cardiopulmonary disease  Final Result by Jose Loredo MD (62/38 7326)      No acute cardiopulmonary disease  Workstation performed: [id number]         ·   ·     Outpatient Tests Requested:  · None      Hospital Course:     Lawerence Closs is a 41 y o  female patient w/PMHx alcohol use disorder, anxiety, depression, who originally presented to the hospital on 11/22/2022 due to anxiety, nervousness, brain fog x 2 days  She came to the ED because she "did not feel safe to drive" due to this brain fog  She drinks a bottle of wine per day  Last drink was the morning prior to admission  She is a daily drinker for the past 7-8 years per family, drinking either wine or rum  She denies known history of withdrawal symptoms  She admits to chronic anxiety as well as insomnia but does not attribute this to her alcohol use  On day of admission pt was tremulous, visibly diaphoretic, anxious, and tachycardic to 130s  She was confused during evaluation as well  Imaging of her abdomen was consistent with hepatomegaly and some cirrhotic changes  She also had elevated LFTs (see above)  On evaluation she also had nystagmus and gait abnormalities  She was subsequently admitted for alcohol withdrawal and Wernicke's encephalopathy  She was started on thiamine replacement and CIWA protocol  She continued to complain of anxiety so she was ordered Librium q8H [address] as well  On day of discharge pt had returned to her baseline mental status  She was still tachycardic although she informed us that her baseline rate is "fast", and appears to be in 110s  She was no longer tremulous on exam  She was eager to go home   She was repeatedly counseled on cessation of alcohol during this admission, with the risks of continued alcohol consumption explained to her including complete liver failure, encephalopathy, memory loss, dementia, and death  Condition at Discharge: stable     Discharge Day Visit / Exam:     Subjective:    Pt alert and oriented this morning  She is eager to go home  She reports enjoying her walks through the halls of the hospital  No complaints at this time  Vitals: Blood Pressure: 118/75 (11/25/22 1105)  Pulse: 104 (11/25/22 1105)  Temperature: 98 7 °F (37 1 °C) (11/25/22 1105)  Temp Source: Oral (11/24/22 1700)  Respirations: 17 (11/25/22 1105)  Height: 5' 6" (167 6 cm) (11/22/22 1800)  Weight - Scale: 87 kg (191 lb 12 8 oz) (11/22/22 1800)  SpO2: 97 % (11/25/22 1105)     Exam:   Physical Exam  Constitutional:       General: She is not in acute distress  Appearance: Normal appearance  She is not ill-appearing  HENT:      Head: Normocephalic and atraumatic  Right Ear: External ear normal       Left Ear: External ear normal       Mouth/Throat:      Mouth: Mucous membranes are moist       Pharynx: Oropharynx is clear  No oropharyngeal exudate  Eyes:      Extraocular Movements: Extraocular movements intact  Pupils: Pupils are equal, round, and reactive to light  Cardiovascular:      Rate and Rhythm: Regular rhythm  Tachycardia present  Pulses: Normal pulses  Heart sounds: Normal heart sounds  Pulmonary:      Effort: Pulmonary effort is normal       Breath sounds: Normal breath sounds  No wheezing, rhonchi or rales  Abdominal:      General: Abdomen is flat  Bowel sounds are normal  There is no distension  Palpations: Abdomen is soft  Tenderness: There is no abdominal tenderness  Musculoskeletal:      Right lower leg: Edema present  Left lower leg: Edema present  Skin:     General: Skin is warm and dry  Capillary Refill: Capillary refill takes less than 2 seconds  Neurological:      General: No focal deficit present  Mental Status: She is alert and oriented to person, place, and time  Psychiatric:         Mood and Affect: Mood normal          Behavior: Behavior normal          Discussion with Family: Mother, Kayla Sotelo    Discharge instructions/Information to patient and family:   See after visit summary for information provided to patient and family  Provisions for Follow-Up Care:  See after visit summary for information related to follow-up care and any pertinent home health orders  Planned Readmission: No     Discharge Medications:  See after visit summary for reconciled discharge medications provided to patient and family        ** Please Note: This note has been constructed using a voice recognition system **

## 2022-11-26 NOTE — ASSESSMENT & PLAN NOTE
Pt presenting with nystagmus, ataxia and brain fog  Also showing cerebellar dysfunction on physical exam  Associated with heavy chronic alcohol use  Confusion does appear to be improving  Pt failed the String Test today so Korsakoff syndrome highly unlikely  Plan:  Completed Thiamine 500 mg IV TID x 2 days  Can continue oral supplemetation as outpatient  250 MG qd x 5 days  Then 100 mg qd x 30 days

## 2022-11-26 NOTE — ASSESSMENT & PLAN NOTE
Likely 2/2 heavy alcohol use  Pt has history of self medicating with Xanax or, more recently, alcohol      Plan:  Melatonin qhs prn

## 2022-11-26 NOTE — ASSESSMENT & PLAN NOTE
Recent Labs     11/23/22  0513 11/24/22  0518 11/25/22  0437   HGB 9 4* 9 3* 9 2*     Lab Results   Component Value Date    [id number] 229 11/22/2022       Pt has history of iron deficiency anemia due to poor oral intake of iron  She has history of B12 deficiency  CBC in early 2019 showing microcytic anemia with MCV 78  However this admission she is showing macrocytosis w/   Likely combination of heavy alcohol consumption with possible concurrent B12 and/or folate deficiency    Plan:  Daily B12  Daily folate  Daily multivitamin

## 2022-11-26 NOTE — ASSESSMENT & PLAN NOTE
Recent Labs     11/23/22  0513 11/24/22  0518 11/25/22  0437   PLT 83* 92* 111*     Likely 2/2 bone marrow suppression in the setting of chronic alcohol dependence  See plan for alcohol dependence

## 2022-11-26 NOTE — ASSESSMENT & PLAN NOTE
Per family patient has been a heavy drinker for past 7-8 years since the passing of her   She drinks upwards of 1 bottle of wine daily  Last drink prior to admission was in the morning when she had one glass of wine  She denies any recent weaning of her alcohol use  She denies history of withdrawal symptoms  She is now presenting with acute anxiety, tactile hallucinations, and palpitations  Also demonstrating signs of Wernicke's encephalopathy  Plan:  Recommend complete abstinence from alcohol and possible inpatient rehab  Pt currently in contemplative stage  Continue discussions with pt regarding importance of quitting alcohol due to the multi-system damage it has been causing  Strongly advise removing all alcohol and benzodiazepines from home     Thiamine repletion (see plan for Wernicke's)  Daily B12 and folate  Daily multivitamin  Follow up with gastroenterology as outpatient

## 2022-11-28 NOTE — UTILIZATION REVIEW
NOTIFICATION OF ADMISSION DISCHARGE   This is a Notification of Discharge from [facility]  Please be advised that this patient has been discharge from our facility  Below you will find the admission and discharge date and time including the patient’s disposition  UTILIZATION REVIEW CONTACT:  Leigha Garnica MA  Utilization   Network Utilization Review Department  Phone: [phone] x carefully listen to the prompts  All voicemails are confidential   Email: [email or web address]     ADMISSION INFORMATION  PRESENTATION DATE: 11/22/2022  1:05 PM  OBERVATION ADMISSION DATE:   INPATIENT ADMISSION DATE: 11/22/22  4:51 PM   DISCHARGE DATE: 11/25/2022  2:50 PM   DISPOSITION:Home/Self Care    IMPORTANT INFORMATION:  Send all requests for admission clinical reviews, approved or denied determinations and any other requests to dedicated fax number below belonging to the campus where the patient is receiving treatment   List of dedicated fax numbers:  [address] DENIALS (Administrative/Medical Necessity) [phone]   [address] (Maternity/NICU/Pediatrics) [phone]   [facility] [phone]   [place] [address] [phone]   [address] [phone]   [address] [facility] [phone]   [address] [place] [address] [phone]   [address] [place], [place] [address] [address] [phone]   [facility]  [phone]   [facility] [facility] [phone]   [address] [facility] [address] [facility] [phone]

## 2023-01-18 NOTE — ASSESSMENT & PLAN NOTE
Recent Labs     11/23/22  0513 11/24/22  0518 11/25/22  0437   * 162* 174*   ALT 29 28 31   ALKPHOS 389* 381* 338*   TBILI 2 15* 1 35* 1 22*     Likely alcohol induced liver injury rather than NAFLD    Plan:  See plans for Wernicke's encephalopathy, Alcohol dependence  Counseled on abstinence from alcohol Azathioprine Counseling:  I discussed with the patient the risks of azathioprine including but not limited to myelosuppression, immunosuppression, hepatotoxicity, lymphoma, and infections.  The patient understands that monitoring is required including baseline LFTs, Creatinine, possible TPMP genotyping and weekly CBCs for the first month and then every 2 weeks thereafter.  The patient verbalized understanding of the proper use and possible adverse effects of azathioprine.  All of the patient's questions and concerns were addressed.

## 2025-06-02 ENCOUNTER — TELEPHONE (OUTPATIENT)
Age: 50
End: 2025-06-02

## 2025-06-02 NOTE — TELEPHONE ENCOUNTER
Patient has been added to the Medication Management wait list without a referral.    Insurance: [facility]  Insurance Type:    Commercial [x]   Medicaid []   County (if applicable)   Medicare []  Location Preference: see wait list notes  Provider Preference: see wait list notes   Virtual: Yes [x] No []  Were outside resources sent: Yes [x] No []    Patient will be emailing referral from [facility] to [email or web address]